# Patient Record
Sex: FEMALE | NOT HISPANIC OR LATINO | URBAN - METROPOLITAN AREA
[De-identification: names, ages, dates, MRNs, and addresses within clinical notes are randomized per-mention and may not be internally consistent; named-entity substitution may affect disease eponyms.]

---

## 2017-03-16 ENCOUNTER — OFFICE VISIT - RIVER FALLS (OUTPATIENT)
Dept: FAMILY MEDICINE | Facility: CLINIC | Age: 72
End: 2017-03-16

## 2017-06-08 ENCOUNTER — OFFICE VISIT - RIVER FALLS (OUTPATIENT)
Dept: FAMILY MEDICINE | Facility: CLINIC | Age: 72
End: 2017-06-08

## 2017-06-08 ASSESSMENT — MIFFLIN-ST. JEOR: SCORE: 1671.15

## 2017-06-14 ENCOUNTER — OFFICE VISIT - RIVER FALLS (OUTPATIENT)
Dept: FAMILY MEDICINE | Facility: CLINIC | Age: 72
End: 2017-06-14

## 2017-06-14 ENCOUNTER — COMMUNICATION - RIVER FALLS (OUTPATIENT)
Dept: FAMILY MEDICINE | Facility: CLINIC | Age: 72
End: 2017-06-14

## 2017-06-15 LAB — HBA1C MFR BLD: 8.1 %

## 2017-11-06 ENCOUNTER — OFFICE VISIT - RIVER FALLS (OUTPATIENT)
Dept: FAMILY MEDICINE | Facility: CLINIC | Age: 72
End: 2017-11-06

## 2017-11-06 ASSESSMENT — MIFFLIN-ST. JEOR: SCORE: 1622.16

## 2017-11-07 LAB
CHOLEST SERPL-MCNC: 166 MG/DL
CHOLEST/HDLC SERPL: 3.4 {RATIO}
CREAT SERPL-MCNC: 2 MG/DL (ref 0.6–0.93)
GLUCOSE BLD-MCNC: 104 MG/DL (ref 65–99)
HBA1C MFR BLD: 7.3 %
HDLC SERPL-MCNC: 49 MG/DL
LDLC SERPL CALC-MCNC: 92 MG/DL
NONHDLC SERPL-MCNC: 117 MG/DL
TRIGL SERPL-MCNC: 149 MG/DL

## 2018-03-14 ENCOUNTER — OFFICE VISIT - RIVER FALLS (OUTPATIENT)
Dept: FAMILY MEDICINE | Facility: CLINIC | Age: 73
End: 2018-03-14

## 2018-03-14 ASSESSMENT — MIFFLIN-ST. JEOR: SCORE: 1647.56

## 2018-03-15 LAB
CHOLEST SERPL-MCNC: 163 MG/DL
CHOLEST/HDLC SERPL: 2.9 {RATIO}
CREAT SERPL-MCNC: 1.47 MG/DL (ref 0.6–0.93)
GLUCOSE BLD-MCNC: 110 MG/DL (ref 65–99)
HBA1C MFR BLD: 7.2 %
HDLC SERPL-MCNC: 57 MG/DL
LDLC SERPL CALC-MCNC: 86 MG/DL
NONHDLC SERPL-MCNC: 106 MG/DL
TRIGL SERPL-MCNC: 107 MG/DL

## 2018-03-26 ENCOUNTER — OFFICE VISIT - RIVER FALLS (OUTPATIENT)
Dept: FAMILY MEDICINE | Facility: CLINIC | Age: 73
End: 2018-03-26

## 2018-03-26 ASSESSMENT — MIFFLIN-ST. JEOR: SCORE: 1661.17

## 2018-10-01 ENCOUNTER — OFFICE VISIT - RIVER FALLS (OUTPATIENT)
Dept: FAMILY MEDICINE | Facility: CLINIC | Age: 73
End: 2018-10-01

## 2018-10-01 ASSESSMENT — MIFFLIN-ST. JEOR: SCORE: 1660.26

## 2019-04-24 ENCOUNTER — OFFICE VISIT - RIVER FALLS (OUTPATIENT)
Dept: FAMILY MEDICINE | Facility: CLINIC | Age: 74
End: 2019-04-24

## 2019-04-24 ASSESSMENT — MIFFLIN-ST. JEOR: SCORE: 1663.89

## 2019-04-25 LAB
A/G RATIO - HISTORICAL: 1.4 (ref 1–2.5)
ALBUMIN SERPL-MCNC: 3.8 GM/DL (ref 3.6–5.1)
ALP SERPL-CCNC: 99 UNIT/L (ref 33–130)
ALT SERPL W P-5'-P-CCNC: 21 UNIT/L (ref 6–29)
AST SERPL W P-5'-P-CCNC: 22 UNIT/L (ref 10–35)
BILIRUB SERPL-MCNC: 0.5 MG/DL (ref 0.2–1.2)
BUN SERPL-MCNC: 38 MG/DL (ref 7–25)
BUN/CREAT RATIO - HISTORICAL: 21 (ref 6–22)
CALCIUM SERPL-MCNC: 9.4 MG/DL (ref 8.6–10.4)
CHLORIDE BLD-SCNC: 103 MMOL/L (ref 98–110)
CHOLEST SERPL-MCNC: 167 MG/DL
CHOLEST/HDLC SERPL: 2.9 {RATIO}
CO2 SERPL-SCNC: 28 MMOL/L (ref 20–32)
CREAT SERPL-MCNC: 1.82 MG/DL (ref 0.6–0.93)
CREAT UR-MCNC: 23 MG/DL (ref 20–275)
EGFRCR SERPLBLD CKD-EPI 2021: 27 ML/MIN/1.73M2
ERYTHROCYTE [DISTWIDTH] IN BLOOD BY AUTOMATED COUNT: 12.1 % (ref 11–15)
GLOBULIN: 2.8 (ref 1.9–3.7)
GLUCOSE BLD-MCNC: 140 MG/DL (ref 65–99)
HBA1C MFR BLD: 8.7 %
HCT VFR BLD AUTO: 39.5 % (ref 35–45)
HDLC SERPL-MCNC: 58 MG/DL
HGB BLD-MCNC: 13.2 GM/DL (ref 11.7–15.5)
LDLC SERPL CALC-MCNC: 89 MG/DL
MCH RBC QN AUTO: 31.8 PG (ref 27–33)
MCHC RBC AUTO-ENTMCNC: 33.4 GM/DL (ref 32–36)
MCV RBC AUTO: 95.2 FL (ref 80–100)
MICROALBUMIN UR-MCNC: 4 MG/DL
MICROALBUMIN/CREAT UR: 174 MG/G{CREAT}
NONHDLC SERPL-MCNC: 109 MG/DL
PLATELET # BLD AUTO: 312 10*3/UL (ref 140–400)
PMV BLD: 11.5 FL (ref 7.5–12.5)
POTASSIUM BLD-SCNC: 4.8 MMOL/L (ref 3.5–5.3)
PROT SERPL-MCNC: 6.6 GM/DL (ref 6.1–8.1)
RBC # BLD AUTO: 4.15 10*6/UL (ref 3.8–5.1)
SODIUM SERPL-SCNC: 141 MMOL/L (ref 135–146)
TRIGL SERPL-MCNC: 107 MG/DL
WBC # BLD AUTO: 10.8 10*3/UL (ref 3.8–10.8)

## 2019-08-15 ENCOUNTER — OFFICE VISIT - RIVER FALLS (OUTPATIENT)
Dept: FAMILY MEDICINE | Facility: CLINIC | Age: 74
End: 2019-08-15

## 2019-08-15 ASSESSMENT — MIFFLIN-ST. JEOR: SCORE: 1663.89

## 2019-08-16 LAB
ERYTHROCYTE [DISTWIDTH] IN BLOOD BY AUTOMATED COUNT: 11.8 % (ref 11–15)
HBA1C MFR BLD: 8.6 %
HCT VFR BLD AUTO: 40.5 % (ref 35–45)
HGB BLD-MCNC: 13.1 GM/DL (ref 11.7–15.5)
MCH RBC QN AUTO: 31.4 PG (ref 27–33)
MCHC RBC AUTO-ENTMCNC: 32.3 GM/DL (ref 32–36)
MCV RBC AUTO: 97.1 FL (ref 80–100)
PLATELET # BLD AUTO: 308 10*3/UL (ref 140–400)
PMV BLD: 11.3 FL (ref 7.5–12.5)
RBC # BLD AUTO: 4.17 10*6/UL (ref 3.8–5.1)
WBC # BLD AUTO: 11.5 10*3/UL (ref 3.8–10.8)

## 2019-08-19 ENCOUNTER — COMMUNICATION - RIVER FALLS (OUTPATIENT)
Dept: FAMILY MEDICINE | Facility: CLINIC | Age: 74
End: 2019-08-19

## 2019-11-21 ENCOUNTER — OFFICE VISIT - RIVER FALLS (OUTPATIENT)
Dept: FAMILY MEDICINE | Facility: CLINIC | Age: 74
End: 2019-11-21

## 2019-11-21 ASSESSMENT — MIFFLIN-ST. JEOR: SCORE: 1663.89

## 2019-11-22 LAB — HBA1C MFR BLD: 8.7 %

## 2019-11-25 ENCOUNTER — COMMUNICATION - RIVER FALLS (OUTPATIENT)
Dept: FAMILY MEDICINE | Facility: CLINIC | Age: 74
End: 2019-11-25

## 2020-03-20 ENCOUNTER — OFFICE VISIT - RIVER FALLS (OUTPATIENT)
Dept: FAMILY MEDICINE | Facility: CLINIC | Age: 75
End: 2020-03-20

## 2020-05-05 ENCOUNTER — OFFICE VISIT - RIVER FALLS (OUTPATIENT)
Dept: FAMILY MEDICINE | Facility: CLINIC | Age: 75
End: 2020-05-05

## 2020-05-05 ASSESSMENT — MIFFLIN-ST. JEOR: SCORE: 1645.75

## 2020-05-15 ENCOUNTER — OFFICE VISIT - RIVER FALLS (OUTPATIENT)
Dept: FAMILY MEDICINE | Facility: CLINIC | Age: 75
End: 2020-05-15

## 2020-05-15 ASSESSMENT — MIFFLIN-ST. JEOR: SCORE: 1627.6

## 2020-11-25 ENCOUNTER — OFFICE VISIT - RIVER FALLS (OUTPATIENT)
Dept: FAMILY MEDICINE | Facility: CLINIC | Age: 75
End: 2020-11-25

## 2020-11-25 ASSESSMENT — MIFFLIN-ST. JEOR: SCORE: 1620.34

## 2020-11-26 LAB
A/G RATIO - HISTORICAL: 1.5 (ref 1–2.5)
ALBUMIN SERPL-MCNC: 4.2 GM/DL (ref 3.6–5.1)
ALP SERPL-CCNC: 149 UNIT/L (ref 37–153)
ALT SERPL W P-5'-P-CCNC: 102 UNIT/L (ref 6–29)
AST SERPL W P-5'-P-CCNC: 77 UNIT/L (ref 10–35)
BILIRUB SERPL-MCNC: 0.6 MG/DL (ref 0.2–1.2)
BUN SERPL-MCNC: 49 MG/DL (ref 7–25)
BUN/CREAT RATIO - HISTORICAL: 18 (ref 6–22)
CALCIUM SERPL-MCNC: 9.9 MG/DL (ref 8.6–10.4)
CHLORIDE BLD-SCNC: 95 MMOL/L (ref 98–110)
CHOLEST SERPL-MCNC: 164 MG/DL
CHOLEST/HDLC SERPL: 2.8 {RATIO}
CO2 SERPL-SCNC: 38 MMOL/L (ref 20–32)
CREAT SERPL-MCNC: 2.65 MG/DL (ref 0.6–0.93)
EGFRCR SERPLBLD CKD-EPI 2021: 17 ML/MIN/1.73M2
ERYTHROCYTE [DISTWIDTH] IN BLOOD BY AUTOMATED COUNT: 11.8 % (ref 11–15)
GLOBULIN: 2.8 (ref 1.9–3.7)
GLUCOSE BLD-MCNC: 46 MG/DL (ref 65–139)
HBA1C MFR BLD: 6.5 %
HCT VFR BLD AUTO: 41.8 % (ref 35–45)
HDLC SERPL-MCNC: 59 MG/DL
HGB BLD-MCNC: 14.2 GM/DL (ref 11.7–15.5)
LDLC SERPL CALC-MCNC: 86 MG/DL
MCH RBC QN AUTO: 32.8 PG (ref 27–33)
MCHC RBC AUTO-ENTMCNC: 34 GM/DL (ref 32–36)
MCV RBC AUTO: 96.5 FL (ref 80–100)
NONHDLC SERPL-MCNC: 105 MG/DL
PLATELET # BLD AUTO: 275 10*3/UL (ref 140–400)
PMV BLD: 11.8 FL (ref 7.5–12.5)
POTASSIUM BLD-SCNC: 3.7 MMOL/L (ref 3.5–5.3)
PROT SERPL-MCNC: 7 GM/DL (ref 6.1–8.1)
RBC # BLD AUTO: 4.33 10*6/UL (ref 3.8–5.1)
SODIUM SERPL-SCNC: 143 MMOL/L (ref 135–146)
TRIGL SERPL-MCNC: 98 MG/DL
TSH SERPL DL<=0.005 MIU/L-ACNC: 3.36 MIU/L (ref 0.4–4.5)
WBC # BLD AUTO: 13 10*3/UL (ref 3.8–10.8)

## 2020-11-30 ENCOUNTER — COMMUNICATION - RIVER FALLS (OUTPATIENT)
Dept: FAMILY MEDICINE | Facility: CLINIC | Age: 75
End: 2020-11-30

## 2022-02-12 VITALS
WEIGHT: 264.4 LBS | SYSTOLIC BLOOD PRESSURE: 128 MMHG | HEIGHT: 62 IN | BODY MASS INDEX: 48.66 KG/M2 | HEART RATE: 66 BPM | SYSTOLIC BLOOD PRESSURE: 128 MMHG | OXYGEN SATURATION: 98 % | DIASTOLIC BLOOD PRESSURE: 66 MMHG | DIASTOLIC BLOOD PRESSURE: 72 MMHG | HEIGHT: 62 IN | BODY MASS INDEX: 49.21 KG/M2 | WEIGHT: 267.4 LBS | HEART RATE: 74 BPM

## 2022-02-12 VITALS
WEIGHT: 269.6 LBS | HEART RATE: 70 BPM | WEIGHT: 269.6 LBS | BODY MASS INDEX: 49.31 KG/M2 | DIASTOLIC BLOOD PRESSURE: 68 MMHG | HEART RATE: 72 BPM | SYSTOLIC BLOOD PRESSURE: 128 MMHG | BODY MASS INDEX: 49.61 KG/M2 | TEMPERATURE: 96.6 F | HEIGHT: 62 IN | SYSTOLIC BLOOD PRESSURE: 120 MMHG | DIASTOLIC BLOOD PRESSURE: 72 MMHG

## 2022-02-12 VITALS
SYSTOLIC BLOOD PRESSURE: 142 MMHG | WEIGHT: 264 LBS | TEMPERATURE: 97.6 F | HEIGHT: 62 IN | TEMPERATURE: 97.4 F | HEART RATE: 64 BPM | BODY MASS INDEX: 47.84 KG/M2 | DIASTOLIC BLOOD PRESSURE: 78 MMHG | OXYGEN SATURATION: 96 % | RESPIRATION RATE: 16 BRPM | BODY MASS INDEX: 48.58 KG/M2 | HEART RATE: 70 BPM | SYSTOLIC BLOOD PRESSURE: 126 MMHG | DIASTOLIC BLOOD PRESSURE: 78 MMHG | HEIGHT: 62 IN | WEIGHT: 260 LBS

## 2022-02-12 VITALS
DIASTOLIC BLOOD PRESSURE: 74 MMHG | WEIGHT: 257.2 LBS | SYSTOLIC BLOOD PRESSURE: 128 MMHG | HEART RATE: 78 BPM | OXYGEN SATURATION: 94 % | TEMPERATURE: 97.7 F | BODY MASS INDEX: 47.04 KG/M2

## 2022-02-12 VITALS
OXYGEN SATURATION: 95 % | WEIGHT: 268 LBS | HEIGHT: 62 IN | SYSTOLIC BLOOD PRESSURE: 130 MMHG | HEART RATE: 71 BPM | BODY MASS INDEX: 49.32 KG/M2 | DIASTOLIC BLOOD PRESSURE: 66 MMHG

## 2022-02-12 VITALS
WEIGHT: 268 LBS | HEIGHT: 62 IN | SYSTOLIC BLOOD PRESSURE: 130 MMHG | OXYGEN SATURATION: 98 % | DIASTOLIC BLOOD PRESSURE: 84 MMHG | HEART RATE: 72 BPM | TEMPERATURE: 97.4 F | BODY MASS INDEX: 49.32 KG/M2

## 2022-02-12 VITALS
DIASTOLIC BLOOD PRESSURE: 74 MMHG | HEART RATE: 69 BPM | OXYGEN SATURATION: 94 % | HEIGHT: 62 IN | WEIGHT: 258.4 LBS | SYSTOLIC BLOOD PRESSURE: 124 MMHG | BODY MASS INDEX: 47.55 KG/M2

## 2022-02-12 VITALS
OXYGEN SATURATION: 95 % | TEMPERATURE: 97.4 F | HEART RATE: 70 BPM | SYSTOLIC BLOOD PRESSURE: 130 MMHG | WEIGHT: 268 LBS | HEIGHT: 62 IN | DIASTOLIC BLOOD PRESSURE: 74 MMHG | BODY MASS INDEX: 49.32 KG/M2

## 2022-02-12 VITALS
SYSTOLIC BLOOD PRESSURE: 124 MMHG | OXYGEN SATURATION: 91 % | HEIGHT: 62 IN | BODY MASS INDEX: 47.62 KG/M2 | HEART RATE: 71 BPM | DIASTOLIC BLOOD PRESSURE: 70 MMHG | WEIGHT: 258.8 LBS

## 2022-02-12 VITALS
HEART RATE: 65 BPM | DIASTOLIC BLOOD PRESSURE: 80 MMHG | HEIGHT: 62 IN | OXYGEN SATURATION: 97 % | SYSTOLIC BLOOD PRESSURE: 132 MMHG | WEIGHT: 267.2 LBS | BODY MASS INDEX: 49.17 KG/M2

## 2022-02-15 NOTE — PROGRESS NOTES
Patient:   BOO ESCOBAR            MRN: [id number]            FIN: [id number]               Age:   74 years     Sex:  Female     :  1945   Associated Diagnoses:   Toe pain, left   Author:   Carmelita Zamora      Visit Information      Date of Service: 2020 09:30 am  Performing Location: [facility]  Encounter#: [id number]   Visit type:  Telephone Encounter.    Source of history:  Patient.    Location of patient:  _home  Call Start Time:   _1021  Call End Time:    _1034      Chief Complaint   _      History of Present Illness   Today's visit was conducted via telephone due to the COVID-19 pandemic.     Reason for visit:  Pt with c/o left foot 2nd toe pain for 6 hours. She has DM. She has had gout in the past but no flare since . She uses allopurinol daily to prevent. She feels like she has a little pimple or blister at the site of the pain. She bumped the foot on her walker yesterday. She has used an ice pack and pain patch on the foot, the patch wasn't helpful.  Denies fever.      Impression and Plan   Diagnosis     Toe pain, left ([id number] M79.675).     Plan:  presumed infection given 'pimple' and trauma. Consider gout if not improving  certainly is at risk for complications given her DM  Will soak foot in epsom salts 15 min bid, start antibiotic, tylenol of rpain. She is to check back with DR [facility] clinic after the weekend, call sooner if worsening, she is agreement to plan.    Orders     Orders (Selected)   Prescriptions  Prescribed  cephalexin 500 mg oral capsule: = 1 cap(s) ( 500 mg ), Oral, tid, x 5 day(s), # 15 cap(s), 0 Refill(s), Type: Acute, [facility], 1 cap(s) Oral tid,x5 day(s).        Health Status   Allergies:    Allergic Reactions (Selected)  Moderate  Neosporin (Skin rash)   Medications:  (Selected)   Prescriptions  Prescribed  Alavert D-12 oral tablet, extended release: 1 tab(s), Oral, q12 hrs, # 180 tab(s), 1 Refill(s), Type:  Maintenance, Pharmacy: [facility] [place] [place], 1 tab(s) Oral q12 hrs  Aspir 81 oral delayed release tablet: 1 tab(s) ( 81 mg ), PO, Daily, # 100 tab(s), 0 Refill(s), Type: Maintenance, Pharmacy: [facility] [place] [place], 1 tab(s) po daily  Ronak Contour next test strips: Ronak Contour next test strips, See Instructions, Instructions: Testing BID, Supply, # 200 EA, 1 Refill(s), Type: Maintenance, Pharmacy: [id number], Testing BID  CONTOUR NEXT TEST Strips: CONTOUR NEXT TEST Strips, See Instructions, Instructions: USE ONE STRIP TO CHECK GLUCOSE BID  DX: E11.9, Supply, # 120 EA, 5 Refill(s), Type: Soft Stop, Pharmacy: [facility] [place], USE ONE STRIP TO CHECK GLUCOSE BID; DX: E11.9  HumaLOG KwikPen 100 units/mL injectable solution: ( 10 unit(s) ), subcutaneous, tidac, # 2 syringes, 5 Refill(s), Type: Maintenance, Pharmacy: [facility] [place], Dispense 90 day supply, 10 unit(s) Subcutaneous tidac  Levemir FlexTouch 100 units/mL subcutaneous solution: See Instructions, Instructions: 40 units sc QHS, # 5 syringes, 3 Refill(s), Type: Maintenance, Pharmacy: [facility] [place], 40 units sc QHS  Medrol Dosepak 4 mg oral tablet: = 1 packet(s), PO, Once, Instructions: as directed on package labeling, # 21 tab(s), 0 Refill(s), Type: Soft Stop, Pharmacy: [facility] [place], 1 packet(s) Oral once,Instr:as directed on package labeling  Relion Pen Needles [id number] MIS: Relion Pen Needles [id number] MIS, See Instructions, Instructions: for DM E11.9 use with insulin injection QID, Supply, # 120 EA, 5 Refill(s), Type: Maintenance, Pharmacy: [facility] [place], for DM E11.9 use with insulin injection QID  allopurinol 100 mg oral tablet: = 1 tab(s) ( 100 mg ), PO, daily, # 90 tab(s), 1 Refill(s), Type: Maintenance, [facility]: [facility], 1 tab(s) Oral daily  atenolol-chlorthalidone 50 mg-25 mg oral  tablet: 1 tab(s), po, daily, # 90 tab(s), 1 Refill(s), Type: Maintenance, Pharmacy: [facility], 1 tab(s) Oral daily  azithromycin 250 mg oral tablet: = 1 tab(s) ( 250 mg ), PO, Daily, # 7 tab(s), 0 Refill(s), Type: Maintenance, Pharmacy: [facility], 1 tab(s) Oral daily,x7 day(s)  betamethasone valerate 0.1% topical cream: 1 adriel, TOP, BID, # 45 g, 1 Refill(s), Type: Maintenance, Pharmacy: [facility], 1 adriel Topical bid  cephalexin 500 mg oral capsule: = 1 cap(s) ( 500 mg ), Oral, tid, x 5 day(s), # 15 cap(s), 0 Refill(s), Type: Acute, Pharmacy: [facility], 1 cap(s) Oral tid,x5 day(s)  cyclobenzaprine 10 mg oral tablet: = 1 tab(s) ( 10 mg ), PO, q6 hrs, PRN: for spasm, # 20 tab(s), 0 Refill(s), Type: Maintenance, Pharmacy: [facility], 1 tab(s) Oral q6 hrs,PRN:for spasm  furosemide 20 mg oral tablet: = 2 tab(s) ( 40 mg ), po, daily, # 180 tab(s), 1 Refill(s), Type: Maintenance, Pharmacy: [facility], 2 tab(s) Oral daily  oxybutynin 5 mg oral tablet: = 1 tab(s) ( 5 mg ), po, bid, # 180 tab(s), 1 Refill(s), Type: Maintenance, Pharmacy: [facility], 1 tab(s) Oral bid  simvastatin 40 mg oral tablet: = 1 tab(s) ( 40 mg ), po, hs, # 90 tab(s), 1 Refill(s), Type: Maintenance, [facility]  [facility], 1 tab(s) Oral hs   Problem list:    All Problems  Allergic Rhinitis / ICD-9- / Confirmed  Asthma / ICD-9-.90 / Confirmed  Basal cell carcinoma / ICD-9-.91 / Confirmed  Chronic Renal Disease, Stage III / ICD-9-.3 / Confirmed  Current use of insulin / SNOMED CT 5671573849 / Confirmed  Gout / SNOMED CT 776306307 / Confirmed  HLD (Hyperlipidemia) / SNOMED CT 981151867 / Confirmed  HTN (hypertension) / SNOMED CT 4699180543 / Confirmed  Impaired circulation of left lower extremity / SNOMED CT 35869924 / Confirmed  Incontinence /  SNOMED CT 01163438 / Confirmed  urge  Menopause / ICD-9-.2 / Confirmed  OA (Osteoarthritis) / ICD-9-.90 / Confirmed  right knee  Obesity / ICD-9-.00 / Confirmed  Overactive bladder / ICD-9-.51 / Confirmed  Type 2 diabetes mellitus / SNOMED CT 109639139 / Confirmed  Inactive: Tobacco abuse / ICD-9-.1  Former tobacco user.  Resolved: *Hospitalized @ [facility] Chest pain  Canceled: Hyperlipemia / ICD-9-.4      Histories   Past Medical History:    Active  Obesity (278.00)  Type 2 diabetes mellitus (606203275)  HTN (hypertension) (6875778071)  OA (Osteoarthritis) (715.90)  Comments:  2010 CST 2:32 PM JIHAN - Milena Lowry LPN  right knee  Asthma (493.90)  Chronic Renal Disease, Stage III (585.3)  Incontinence (62170803)  Comments:  2010 CST 2:33 PM CST - Shackleton LPN, Milena  urge  Overactive bladder (596.51)  Menopause (627.2)  Allergic Rhinitis (477)  Resolved  *Hospitalized @ [facility] Chest pain:  Resolved.   Family History:    Cancer  Grandmother (M)  Emphysema of lung  Father     Procedure history:    Anal sphincterotomy (SNOMED CT 298171495) on 2005 at 60 Years.  Comments:  2013 1:31 PM JIHAN - Zohra Terrazas MD  Carpal tunnel release (SNOMED CT 524956555) in  at 43 Years.  Comments:  2010 10:57 AM T - Milena Lowry LPN  bilateral   section (SNOMED CT 37296857) in  at 34 Years.  Appendectomy (SNOMED CT 752604149) in 1952 at 7 Years.  Tonsillectomy (SNOMED CT 255344056) in 1947 at 13 Months.   Social History:        Alcohol Assessment: Denies Alcohol Use            Never      Tobacco Assessment: Past            Past      Substance Abuse Assessment            Never      Employment and Education Assessment            Retired      Home and Environment Assessment            Marital status: .  Lives with Self.  1 children.  Living situation: Home/Independent.      Exercise and Physical Activity  Assessment: Does not exercise            Exercise frequency: No regular exercise..

## 2022-02-15 NOTE — NURSING NOTE
Comprehensive Intake Entered On:  11/21/2019 10:25 AM CST    Performed On:  11/21/2019 10:20 AM CST by Grace Dumont CMA               Summary   Chief Complaint :   Pt here for med ck and cough ER f/u   Weight Measured :   268 lb(Converted to: 268 lb 0 oz, 121.56 kg)    Height Measured :   62 in(Converted to: 5 ft 2 in, 157.48 cm)    Body Mass Index :   49.01 kg/m2 (HI)    Body Surface Area :   2.3 m2   Systolic Blood Pressure :   130 mmHg   Diastolic Blood Pressure :   84 mmHg (HI)    Mean Arterial Pressure :   99 mmHg   Peripheral Pulse Rate :   72 bpm   Temperature Tympanic :   97.4 DegF(Converted to: 36.3 DegC)  (LOW)    Oxygen Saturation :   98 %   Grace Dumont CMA - 11/21/2019 10:20 AM CST   Health Status   Allergies Verified? :   Yes   Medication History Verified? :   Yes   Medical History Verified? :   Yes   Pre-Visit Planning Status :   Completed   Tobacco Use? :   Former smoker   Grace Dumont CMA - 11/21/2019 10:20 AM CST   Consents   Consent for Immunization Exchange :   Consent Granted   Consent for Immunizations to Providers :   Consent Granted   Grace Dumont CMA - 11/21/2019 10:20 AM CST   Meds / Allergies   (As Of: 11/21/2019 10:25:49 AM CST)   Allergies (Active)   Neosporin  Estimated Onset Date:   <not entered> 2012 ; Reactions:   skin rash ; Created By:   Thony Salazar MD; Reaction Status:   Active ; Category:   Drug ; Substance:   Neosporin ; Type:   Allergy ; Severity:   Moderate ; Updated By:   Thony Salazar MD; Reviewed Date:   11/21/2019 10:24 AM CST        Medication List   (As Of: 11/21/2019 10:25:49 AM CST)   Prescription/Discharge Order    allopurinol  :   allopurinol ; Status:   Prescribed ; Ordered As Mnemonic:   allopurinol 100 mg oral tablet ; Simple Display Line:   100 mg, 1 tab(s), PO, daily, 90 tab(s), 1 Refill(s) ; Ordering Provider:   Thony Salazar MD; Catalog Code:   allopurinol ; Order Dt/Tm:   4/24/2019 2:14:00 PM CDT          aspirin  :   aspirin ; Status:    Prescribed ; Ordered As Mnemonic:   Aspir 81 oral delayed release tablet ; Simple Display Line:   81 mg, 1 tab(s), PO, Daily, 100 tab(s), 0 Refill(s) ; Ordering Provider:   Thony Salazar MD; Catalog Code:   aspirin ; Order Dt/Tm:   3/26/2018 1:30:54 PM CDT          atenolol-chlorthalidone  :   atenolol-chlorthalidone ; Status:   Prescribed ; Ordered As Mnemonic:   atenolol-chlorthalidone 50 mg-25 mg oral tablet ; Simple Display Line:   1 tab(s), po, daily, 90 tab(s), 0 Refill(s) ; Ordering Provider:   Thony Salazar MD; Catalog Code:   atenolol-chlorthalidone ; Order Dt/Tm:   8/27/2019 8:52:27 AM CDT          betamethasone topical  :   betamethasone topical ; Status:   Prescribed ; Ordered As Mnemonic:   betamethasone valerate 0.1% topical cream ; Simple Display Line:   1 adriel, TOP, BID, 45 g, 1 Refill(s) ; Ordering Provider:   Thony Salazar MD; Catalog Code:   betamethasone topical ; Order Dt/Tm:   3/26/2018 1:30:02 PM CDT          cyclobenzaprine  :   cyclobenzaprine ; Status:   Prescribed ; Ordered As Mnemonic:   cyclobenzaprine 10 mg oral tablet ; Simple Display Line:   10 mg, 1 tab(s), PO, q6 hrs, PRN: for spasm, 20 tab(s), 0 Refill(s) ; Ordering Provider:   Thony Salazar MD; Catalog Code:   cyclobenzaprine ; Order Dt/Tm:   8/15/2019 1:57:27 PM CDT          furosemide  :   furosemide ; Status:   Prescribed ; Ordered As Mnemonic:   furosemide 20 mg oral tablet ; Simple Display Line:   40 mg, 2 tab(s), po, daily, 180 tab(s), 1 Refill(s) ; Ordering Provider:   Thony Salazar MD; Catalog Code:   furosemide ; Order Dt/Tm:   4/24/2019 2:13:34 PM CDT          insulin detemir  :   insulin detemir ; Status:   Prescribed ; Ordered As Mnemonic:   Levemir FlexTouch 100 units/mL subcutaneous solution ; Simple Display Line:   See Instructions, 40 units sc QHS, 5 syringes, 3 Refill(s) ; Ordering Provider:   Thony Salazar MD; Catalog Code:   insulin detemir ; Order Dt/Tm:   10/7/2019 1:33:41 PM CDT          insulin lispro   :   insulin lispro ; Status:   Prescribed ; Ordered As Mnemonic:   HumaLOG KwikPen 100 units/mL injectable solution ; Simple Display Line:   7 unit(s), subcutaneous, tidac, 2 syringes, 5 Refill(s) ; Ordering Provider:   Thony Salazar MD; Catalog Code:   insulin lispro ; Order Dt/Tm:   8/14/2019 3:54:51 PM CDT          loratadine-pseudoephedrine  :   loratadine-pseudoephedrine ; Status:   Prescribed ; Ordered As Mnemonic:   Alavert D-12 oral tablet, extended release ; Simple Display Line:   1 tab(s), Oral, q12 hrs, 180 tab(s), 1 Refill(s) ; Ordering Provider:   Thony Salazar MD; Catalog Code:   loratadine-pseudoephedrine ; Order Dt/Tm:   6/18/2019 3:11:31 PM CDT          Miscellaneous Prescription  :   Miscellaneous Prescription ; Status:   Prescribed ; Ordered As Mnemonic:   Relion Pen Needles [id number] MIS ; Simple Display Line:   See Instructions, for DM E11.9 use with insulin injection QID, 120 EA, 5 Refill(s) ; Ordering Provider:   Thony Salazar MD; Catalog Code:   Miscellaneous Prescription ; Order Dt/Tm:   4/24/2019 2:12:02 PM CDT          Miscellaneous Rx Supply  :   Miscellaneous Rx Supply ; Status:   Prescribed ; Ordered As Mnemonic:   Ronak Contour next test strips ; Simple Display Line:   See Instructions, Testing BID, 200 EA, 1 Refill(s) ; Ordering Provider:   Thony Salazar MD; Catalog Code:   Miscellaneous Rx Supply ; Order Dt/Tm:   9/12/2016 11:07:30 AM CDT          Miscellaneous Rx Supply  :   Miscellaneous Rx Supply ; Status:   Prescribed ; Ordered As Mnemonic:   CONTOUR NEXT TEST Strips ; Simple Display Line:   See Instructions, USE ONE STRIP TO CHECK GLUCOSE BID  DX: E11.9, 120 EA, 5 Refill(s) ; Ordering Provider:   Thony Salazar MD; Catalog Code:   Miscellaneous Rx Supply ; Order Dt/Tm:   10/8/2019 7:29:46 AM CDT          oxybutynin  :   oxybutynin ; Status:   Prescribed ; Ordered As Mnemonic:   oxybutynin 5 mg oral tablet ; Simple Display Line:   5 mg, 1 tab(s), po, bid, 180 tab(s), 1  Refill(s) ; Ordering Provider:   Thony Salazar MD; Catalog Code:   oxybutynin ; Order Dt/Tm:   4/24/2019 2:13:44 PM CDT          simvastatin  :   simvastatin ; Status:   Prescribed ; Ordered As Mnemonic:   simvastatin 40 mg oral tablet ; Simple Display Line:   40 mg, 1 tab(s), po, hs, 90 tab(s), 1 Refill(s) ; Ordering Provider:   Thony Salazar MD; Catalog Code:   simvastatin ; Order Dt/Tm:   4/24/2019 2:13:51 PM CDT            Home Meds    benzonatate  :   benzonatate ; Status:   Documented ; Ordered As Mnemonic:   benzonatate 100 mg oral capsule ; Simple Display Line:   100 mg, 1 cap(s), Oral, tid, for 7 day(s), 0 Refill(s) ; Catalog Code:   benzonatate ; Order Dt/Tm:   11/21/2019 10:22:53 AM CST          naproxen  :   naproxen ; Status:   Documented ; Ordered As Mnemonic:   naproxen 250 mg oral tablet ; Simple Display Line:   250 mg, 1 tab(s), Oral, bid, 0 Refill(s) ; Catalog Code:   naproxen ; Order Dt/Tm:   11/21/2019 10:22:36 AM CST

## 2022-02-15 NOTE — TELEPHONE ENCOUNTER
Entered by Bryn Harman MD on June 01, 2021 7:15:54 AM CDT  ---------------------  From: Bryn Harman MD   To: [facility]    Sent: 6/1/2021 7:15:54 AM CDT  Subject: Medication Management     ** Submitted: **  Complete:insulin detemir (Levemir FlexTouch 100 units/mL subcutaneous solution)   Signed by Bryn Harman MD  6/1/2021 12:15:00 PM [facility]    ** Approved **  insulin detemir (Levemir FlexTouch U-100 Insulin 100 unit/mL (3 mL) subcutaneous pen)  TAKE 40 UNITS SUBCUTANEOUSLY (UNDER THE SKIN) ONCE DAILY AT BEDTIME  Qty:  15 mL        Days Supply:  36        Refills:  0          Substitutions Allowed     Route To Pharmacy - [facility]   Signed by Bryn Harman MD            ------------------------------------------  From: [facility]  To: Thony Salazar MD  Sent: May 28, 2021 5:35:09 PM CDT  Subject: Medication Management  Due: May 14, 2021 8:11:22 PM CDT     ** On Hold Pending Signature **     Dispensed Drug: insulin detemir (Levemir FlexTouch 100 units/mL subcutaneous solution), TAKE 40 UNITS SUBCUTANEOUSLY (UNDER THE SKIN) ONCE DAILY AT BEDTIME  Quantity: 15 mL  Days Supply: 36  Refills: 0  Substitutions Allowed  Notes from Pharmacy:  ------------------------------------------

## 2022-02-15 NOTE — NURSING NOTE
Comprehensive Intake Entered On:  11/25/2020 2:13 PM CST    Performed On:  11/25/2020 2:05 PM CST by Zohra Gibbs MA               Summary   Chief Complaint :   med check, needs refills.  also c/o neck/right shoulder pain.   Weight Measured :   258.4 lb(Converted to: 258 lb 6 oz, 117.208 kg)    Height Measured :   62 in(Converted to: 5 ft 2 in, 157.48 cm)    Body Mass Index :   47.26 kg/m2 (HI)    Body Surface Area :   2.26 m2   Systolic Blood Pressure :   124 mmHg   Diastolic Blood Pressure :   74 mmHg   Mean Arterial Pressure :   91 mmHg   Peripheral Pulse Rate :   69 bpm   BP Site :   Right arm   Pulse Site :   Radial artery   BP Method :   Manual   HR Method :   Manual   Oxygen Saturation :   94 %   Zohra Gibbs MA - 11/25/2020 2:05 PM CST   Meds / Allergies   (As Of: 11/25/2020 2:13:31 PM CST)   Allergies (Active)   Neosporin  Estimated Onset Date:   <not entered> 2012 ; Reactions:   skin rash ; Created By:   Thony Salazar MD; Reaction Status:   Active ; Category:   Drug ; Substance:   Neosporin ; Type:   Allergy ; Severity:   Moderate ; Updated By:   Thony Salazar MD; Reviewed Date:   5/15/2020 1:23 PM CDT        Medication List   (As Of: 11/25/2020 2:13:31 PM CST)   Prescription/Discharge Order    allopurinol  :   allopurinol ; Status:   Prescribed ; Ordered As Mnemonic:   allopurinol 100 mg oral tablet ; Simple Display Line:   100 mg, 1 tab(s), PO, daily, 90 tab(s), 1 Refill(s) ; Ordering Provider:   Thony Salazar MD; Catalog Code:   allopurinol ; Order Dt/Tm:   8/19/2020 11:12:35 AM CDT          aspirin  :   aspirin ; Status:   Prescribed ; Ordered As Mnemonic:   Aspir 81 oral delayed release tablet ; Simple Display Line:   81 mg, 1 tab(s), PO, Daily, 100 tab(s), 0 Refill(s) ; Ordering Provider:   Thony Salazar MD; Catalog Code:   aspirin ; Order Dt/Tm:   3/26/2018 1:30:54 PM CDT          atenolol-chlorthalidone  :   atenolol-chlorthalidone ; Status:   Prescribed ; Ordered As Mnemonic:    atenolol-chlorthalidone 50 mg-25 mg oral tablet ; Simple Display Line:   1 tab(s), po, daily, 30 tab(s), 0 Refill(s) ; Ordering Provider:   Thony Salazar MD; Catalog Code:   atenolol-chlorthalidone ; Order Dt/Tm:   11/11/2020 11:15:55 AM CST          betamethasone topical  :   betamethasone topical ; Status:   Prescribed ; Ordered As Mnemonic:   betamethasone valerate 0.1% topical cream ; Simple Display Line:   1 adriel, TOP, BID, 45 g, 1 Refill(s) ; Ordering Provider:   Thony Salazar MD; Catalog Code:   betamethasone topical ; Order Dt/Tm:   11/21/2019 10:49:50 AM CST          furosemide  :   furosemide ; Status:   Prescribed ; Ordered As Mnemonic:   furosemide 20 mg oral tablet ; Simple Display Line:   40 mg, 2 tab(s), po, daily, 180 tab(s), 1 Refill(s) ; Ordering Provider:   Thony Salazar MD; Catalog Code:   furosemide ; Order Dt/Tm:   11/21/2019 10:49:55 AM CST          insulin detemir  :   insulin detemir ; Status:   Prescribed ; Ordered As Mnemonic:   Levemir FlexTouch 100 units/mL subcutaneous solution ; Simple Display Line:   See Instructions, 40 units sc QHS.....for DM II  E11.9, 15 mL, 3 Refill(s) ; Ordering Provider:   Thony Salazar MD; Catalog Code:   insulin detemir ; Order Dt/Tm:   10/5/2020 12:56:37 PM CDT          insulin lispro  :   insulin lispro ; Status:   Prescribed ; Ordered As Mnemonic:   HumaLOG KwikPen 100 units/mL injectable solution ; Simple Display Line:   10 unit(s), subcutaneous, tidac, 15 mL, 1 Refill(s) ; Ordering Provider:   Thony Salazar MD; Catalog Code:   insulin lispro ; Order Dt/Tm:   9/23/2020 5:25:23 PM CDT          loratadine-pseudoephedrine  :   loratadine-pseudoephedrine ; Status:   Prescribed ; Ordered As Mnemonic:   Alavert D-12 oral tablet, extended release ; Simple Display Line:   1 tab(s), Oral, q12 hrs, 180 tab(s), 1 Refill(s) ; Ordering Provider:   Thony Salazar MD; Catalog Code:   loratadine-pseudoephedrine ; Order Dt/Tm:   11/21/2019 10:49:43 AM CST           Miscellaneous Prescription  :   Miscellaneous Prescription ; Status:   Prescribed ; Ordered As Mnemonic:   Relion Pen Needles [id number] MIS ; Simple Display Line:   See Instructions, for DM E11.9 use with insulin injection QID, 120 EA, 5 Refill(s) ; Ordering Provider:   Thony Salazar MD; Catalog Code:   Miscellaneous Prescription ; Order Dt/Tm:   8/19/2020 11:13:11 AM CDT          Miscellaneous Rx Supply  :   Miscellaneous Rx Supply ; Status:   Prescribed ; Ordered As Mnemonic:   Ronak Contour next test strips ; Simple Display Line:   See Instructions, Testing BID, 200 EA, 1 Refill(s) ; Ordering Provider:   Thony Salazar MD; Catalog Code:   Miscellaneous Rx Supply ; Order Dt/Tm:   9/12/2016 11:07:30 AM CDT          Miscellaneous Rx Supply  :   Miscellaneous Rx Supply ; Status:   Prescribed ; Ordered As Mnemonic:   CONTOUR NEXT TEST Strips ; Simple Display Line:   See Instructions, USE ONE STRIP TO CHECK GLUCOSE BID  DX: E11.9, 120 EA, 5 Refill(s) ; Ordering Provider:   Thony Salazar MD; Catalog Code:   Miscellaneous Rx Supply ; Order Dt/Tm:   11/19/2020 11:17:43 AM CST          simvastatin  :   simvastatin ; Status:   Prescribed ; Ordered As Mnemonic:   simvastatin 40 mg oral tablet ; Simple Display Line:   40 mg, 1 tab(s), po, hs, 90 tab(s), 1 Refill(s) ; Ordering Provider:   Thony Salazar MD; Catalog Code:   simvastatin ; Order Dt/Tm:   7/1/2020 2:21:42 PM CDT            ID Risk Screen   Recent Travel History :   No recent travel   Family Member Travel History :   No recent travel   Other Exposure to Infectious Disease :   Unknown   Zohra Gibbs MA - 11/25/2020 2:05 PM CST   Social History   Social History   (As Of: 11/25/2020 2:13:31 PM CST)   Alcohol:  Denies Alcohol Use      Never   (Last Updated: 1/18/2013 1:31:28 PM CST by Zohra Terrazas)          Tobacco:  Past      Former smoker, quit more than 30 days ago   (Last Updated: 11/25/2020 2:10:47 PM CST by Zohra Gibbs MA)          Electronic  Cigarette/Vaping:        Electronic Cigarette Use: Never.   (Last Updated: 11/25/2020 2:13:23 PM CST by Zohra Gibbs MA)          Substance Abuse:        Never   (Last Updated: 9/16/2013 10:39:31 AM CDT by Thony Salazar MD)          Employment/School:        Retired   (Last Updated: 9/16/2013 10:39:42 AM CDT by Thony Salazar MD)          Home/Environment:        Marital status: .  Lives with Self.  1 children.  Living situation: Home/Independent.   (Last Updated: 9/16/2013 10:40:37 AM CDT by Thony Salazar MD)          Exercise:  Does not exercise      Exercise frequency: No regular exercise..   (Last Updated: 1/18/2013 1:31:53 PM CST by Zohra Terrazas)

## 2022-02-15 NOTE — PROGRESS NOTES
Patient:   BOO ESCOBAR            MRN: [id number]            FIN: [id number]               Age:   73 years     Sex:  Female     :  1945   Associated Diagnoses:   HTN (hypertension); HLD (Hyperlipidemia); Type 2 diabetes mellitus; Gout; Overactive bladder   Author:   Thony Salazar MD      Impression and Plan   Diagnosis     HTN (hypertension) (IVK32-FL I10).     Course:  Well controlled.    Orders     Orders   Charges (Evaluation and Management):  55251 office outpatient visit 25 minutes (Charge) (Order): Quantity: 1, Type 2 diabetes mellitus  HTN (hypertension)  Asthma  HLD (Hyperlipidemia).     Orders (Selected)   Prescriptions  Prescribed  atenolol-chlorthalidone 50 mg-25 mg oral tablet: 1 tab(s), po, daily, # 90 tab(s), 1 Refill(s), Type: Maintenance, Pharmacy: [facility] [place], 1 tab(s) Oral daily  lisinopril 10 mg oral tablet: = 1 tab(s) ( 10 mg ), PO, Daily, # 90 tab(s), 1 Refill(s), Type: Maintenance, Pharmacy: [facility] [place], 1 tab(s) Oral daily.     Diagnosis     HLD (Hyperlipidemia) (DHF18-DS E78.00).     Course:  Progressing as expected.    Orders     Orders (Selected)   Prescriptions  Prescribed  simvastatin 40 mg oral tablet: = 1 tab(s) ( 40 mg ), po, hs, # 90 tab(s), 1 Refill(s), Type: Maintenance, Pharmacy: [facility] [place], [place], 1 tab(s) Oral hs.     Diagnosis     Type 2 diabetes mellitus (HOG98-OC E11.9).     Course:  Well controlled.    Orders     Orders (Selected)   Prescriptions  Prescribed  HumaLOG KwikPen 100 units/mL injectable solution: ( 4 unit(s) ), subcutaneous, tidac, # 2 syringes, 5 Refill(s), Type: Maintenance, Pharmacy: [facility] [place], Dispense 90 day supply, 4 unit(s) Subcutaneous tidac  Levemir FlexTouch 100 units/mL subcutaneous solution: See Instructions, Instructions: 36 units sc QHS, # 5 syringes, 3 Refill(s), Type: Maintenance, Pharmacy: [facility] [place], 36 units sc QHS.      Diagnosis     Gout (STZ51-OB M10.9).     Course:  Progressing as expected.    Orders     Orders (Selected)   Prescriptions  Prescribed  allopurinol 100 mg oral tablet: = 1 tab(s) ( 100 mg ), PO, daily, # 90 tab(s), 1 Refill(s), Type: Maintenance, Pharmacy: [facility] [place], [place], 1 tab(s) Oral daily.     Diagnosis     Overactive bladder (CIO08-OP N31.8).     Course:  Progressing as expected.    Orders     Orders (Selected)   Prescriptions  Prescribed  oxybutynin 5 mg oral tablet: = 1 tab(s) ( 5 mg ), po, bid, # 180 tab(s), 1 Refill(s), Type: Maintenance, Pharmacy: [facility] [place], [place], 1 tab(s) Oral bid.        Visit Information      Date of Service: 04/24/2019 01:52 pm  Performing Location: [facility]  Encounter#: [id number]      Primary Care Provider (PCP):  Thony Salazar MD# [id number]      Referring Provider:  Thony Salazar MD# [id number]   Visit type:  Scheduled follow-up.    Accompanied by:  No one.    Source of history:  Self.    History limitation:  None.       Chief Complaint   Chief complaint discussed and confirmed correct.     4/24/2019 1:55 PM CDT    Pt here for med ck        History of Present Illness             The patient presents for follow-up evaluation of diabetes.  The quality of the patient's diabetes is described as being unchanged from previous visit.  Relieving factors consist of diet changes, increased activity and medication.  Associated symptoms consist of none.  Medical encounters: none.  Compliance problems: none.  Glucose results: within target range.  Hemoglobin A1c results: > 6% and within target range.  Lifestyle modification: weight reduction, diet, increased physical activity.  Medications:.  Additional pertinent history: last podiatric foot exam: 4/24/2019 and eye exam recommended.  Notes.               The patient presents for follow-up evaluation of hypertension.  The quality of hypertension symptom(s) since the  patient's last visit is described as being unchanged.  The severity of the hypertension symptom(s) since the last visit is moderate.  Since the patient's last visit, the timing/course of hypertension symptom(s) is constant.  Exacerbating factors consist of none.  Relieving factors consist of medication.  Associated symptoms consist of none.  Prior treatment consists of lifestyle modification (weight reduction, dietary sodium restriction, increased physical activity, adoption of DASH eating plan).  Medical encounters: none.  Compliance problems: none.        Interval History   Cholesterol Management   Total cholesterol results < 200 mg/dL (optimal).  LDL cholesterol results < 100 mg/dL (optimal).  HDL cholesterol results >40mg/dl.  Triglyceride results < 150 mg/dL (normal).  The course is progressing as expected.  The effect on daily activities is no change in activity level and no change in eating habits.  Associated symptoms characterized by no fatigue, chest pain, joint pain, muscle weakness or myalgias.        Review of Systems   /   Constitutional:  Negative.    Eye:  Negative.    Ear/Nose/Mouth/Throat:  Negative.    Respiratory:  Negative.    Cardiovascular:  Negative.    Gastrointestinal:  Negative.    Genitourinary:  Negative.    Hematology/Lymphatics:  Negative.    Endocrine:  Negative.    Immunologic:  Negative.    Musculoskeletal:  Back pain.    Integumentary   Neurologic:  Negative.    Psychiatric:  Negative.    All other systems reviewed and negative      Health Status   Allergies:    Allergic Reactions (Selected)  Moderate  Neosporin (Skin rash)   Medications:  (Selected)   Prescriptions  Prescribed  Aspir 81 oral delayed release tablet: 1 tab(s) ( 81 mg ), PO, Daily, # 100 tab(s), 0 Refill(s), Type: Maintenance, Pharmacy: [facility] [place], 1 tab(s) po daily  Ronak Contour next test strips: Ronak Contour next test strips, See Instructions, Instructions: Testing BID, Supply, # 200 EA,  1 Refill(s), Type: Maintenance, Pharmacy: [id number], Testing BID  CONTOUR NEXT TEST Strips: CONTOUR NEXT TEST Strips, See Instructions, Instructions: USE ONE STRIP TO CHECK GLUCOSE QID  DX: E11.9, Supply, # 120 EA, 5 Refill(s), Type: Soft Stop, Pharmacy: [facility] [place], [place], USE ONE STRIP TO CHECK GLUCOSE QID; DX: E11.9  HumaLOG KwikPen 100 units/mL injectable solution: ( 4 unit(s) ), subcutaneous, tidac, # 2 syringes, 5 Refill(s), Type: Maintenance, Pharmacy: [facility] [place], Dispense 90 day supply, 4 unit(s) Subcutaneous tidac  Levemir FlexTouch 100 units/mL subcutaneous solution: See Instructions, Instructions: 36 units sc QHS, # 5 syringes, 3 Refill(s), Type: Maintenance, Pharmacy: [facility] [place], [place], 36 units sc QHS  Loratadine-D 12 Hour oral tablet, extended release: 1 tab(s), po, q12 hrs, # 180 tab(s), 1 Refill(s), Type: Maintenance, Pharmacy: [facility] [place], 1 tab(s) Oral q12 hrs  Relion Pen Needles [id number] MIS: Relion Pen Needles [id number] MIS, See Instructions, Instructions: for DM E11.9 use with insulin injection QID, Supply, # 120 EA, 5 Refill(s), Type: Maintenance, Pharmacy: [facility] [place], for DM E11.9 use with insulin injection QID  allopurinol 100 mg oral tablet: = 1 tab(s) ( 100 mg ), PO, daily, # 90 tab(s), 1 Refill(s), Type: Maintenance, Pharmacy: [facility] [place], 1 tab(s) Oral daily  atenolol-chlorthalidone 50 mg-25 mg oral tablet: 1 tab(s), po, daily, # 90 tab(s), 1 Refill(s), Type: Maintenance, Pharmacy: [facility] [place], 1 tab(s) Oral daily  betamethasone valerate 0.1% topical cream: 1 adriel, TOP, BID, # 45 g, 1 Refill(s), Type: Maintenance, Pharmacy: [facility], 1 adriel top bid  furosemide 20 mg oral tablet: = 2 tab(s) ( 40 mg ), po, daily, # 180 tab(s), 1 Refill(s), Type: Maintenance, [facility]  [place], 2 tab(s) Oral daily  lisinopril 10 mg oral tablet: = 1 tab(s) ( 10 mg ), PO, Daily, # 90 tab(s), 1 Refill(s), Type: Maintenance, Pharmacy: [facility] [place], [place], 1 tab(s) Oral daily  oxybutynin 5 mg oral tablet: = 1 tab(s) ( 5 mg ), po, bid, # 180 tab(s), 1 Refill(s), Type: Maintenance, Pharmacy: [facility] [place], 1 tab(s) Oral bid  simvastatin 40 mg oral tablet: = 1 tab(s) ( 40 mg ), po, hs, # 90 tab(s), 1 Refill(s), Type: Maintenance, Pharmacy: [facility] [place], [place], 1 tab(s) Oral hs   Problem list:    All Problems  Allergic Rhinitis / ICD-9- / Confirmed  Asthma / ICD-9-.90 / Confirmed  Basal cell carcinoma / ICD-9-.91 / Confirmed  Chronic Renal Disease, Stage III / ICD-9-.3 / Confirmed  Current use of insulin / SNOMED CT 9892735428 / Confirmed  Gout / SNOMED CT 005867851 / Confirmed  HLD (Hyperlipidemia) / SNOMED CT 773227655 / Confirmed  HTN (hypertension) / SNOMED CT 6573171070 / Confirmed  Impaired circulation of left lower extremity / SNOMED CT 50148020 / Confirmed  Incontinence / SNOMED CT 55634912 / Confirmed  Menopause / ICD-9-.2 / Confirmed  OA (Osteoarthritis) / ICD-9-.90 / Confirmed  Obesity / ICD-9-.00 / Confirmed  Overactive bladder / ICD-9-.51 / Confirmed  Type 2 diabetes mellitus / SNOMED CT 487826954 / Confirmed  Inactive: Tobacco abuse / ICD-9-.1  Resolved: *Hospitalized @ [facility] - Chest pain  Canceled: Hyperlipemia / ICD-9-.4      Histories   Past Medical History:    Active  Obesity (278.00)  Type 2 diabetes mellitus (228101685)  HTN (hypertension) (1845094223)  OA (Osteoarthritis) (715.90)  Comments:  2/1/2010 CST 2:32 PM CST - Shackleton LPN, Milena  right knee  Asthma (493.90)  Chronic Renal Disease, Stage III (585.3)  Incontinence (87483059)  Comments:  2/1/2010 CST 2:33 PM CST - Shackleton LPN, Milena  urge  Overactive bladder (596.51)  Menopause (627.2)  Allergic Rhinitis  (822)  Resolved  *Hospitalized @ [place] - Chest pain:  Resolved.   Family History:    Cancer  Grandmother (M)  Emphysema of lung  Father     Procedure history:    Anal sphincterotomy (SNOMED CT 279647728) on 2005 at 60 Years.  Comments:  2013 1:31 PM CST - Zohra Terrazas MD  Carpal tunnel release (SNOMED CT 014897112) in  at 43 Years.  Comments:  2010 10:57 AM CDT - Milena Lowry LPN  bilateral   section (SNOMED CT 95606812) in  at 34 Years.  Appendectomy (SNOMED CT 533257818) in 1952 at 7 Years.  Tonsillectomy (SNOMED CT 634883829) in 1947 at 13 Months.   Social History:        Alcohol Assessment: Denies Alcohol Use            Never      Tobacco Assessment: Past            Past      Substance Abuse Assessment            Never      Employment and Education Assessment            Retired      Home and Environment Assessment            Marital status: .  Lives with Self.  1 children.  Living situation: Home/Independent.      Exercise and Physical Activity Assessment: Does not exercise            Exercise frequency: No regular exercise..      Physical Examination   Vital signs reviewed  and within acceptable limits    Vital Signs   2019 1:55 PM CDT Peripheral Pulse Rate 71 bpm    Systolic Blood Pressure 130 mmHg    Diastolic Blood Pressure 66 mmHg    Mean Arterial Pressure 87 mmHg    Oxygen Saturation 95 %      Measurements from flowsheet : Measurements   2019 1:55 PM CDT Height Measured - Standard 62 in    Weight Measured - Standard 268 lb    BSA 2.3 m2    Body Mass Index 49.01 kg/m2  HI      General:  Alert and oriented, No acute distress, elevated BMI.    HENT:  Normocephalic.    Neck:  Supple, No lymphadenopathy, No thyromegaly.    Respiratory:  Lungs are clear to auscultation, Respirations are non-labored, Breath sounds are equal, Symmetrical chest wall expansion.    Cardiovascular:  Normal rate, Regular rhythm, No murmur, No gallop, Good  pulses equal in all extremities, Normal peripheral perfusion, No edema.    Gastrointestinal:  Soft, Non-tender, Non-distended, Normal bowel sounds, No organomegaly.    Musculoskeletal:  Normal range of motion, No swelling, No deformity.         Upper extremity exam: Shoulder ( Right, No deformity, No erythema, No ecchymosis, No swelling, Tenderness, No wound, No crepitus, No numbness, Strength  5 /5, Range of motion ( Diminished ) ).    Integumentary:  Warm, Dry, Pink, No foot ulcers or skin lesions..    Feet:  Normal by visual exam, Sensation intact, By monofilament exam.    Neurologic:  Alert, Oriented, Normal sensory, Normal motor function, No focal deficits.    Psychiatric:  Cooperative, Appropriate mood & affect.       Health Maintenance   Immunizations      Review / Management   Results review

## 2022-02-15 NOTE — TELEPHONE ENCOUNTER
Entered by Cyndee Sampson CMA on May 03, 2021 9:13:14 AM CDT  ---------------------  From: Cyndee Sampson CMA   To: [facility]    Sent: 5/3/2021 9:13:14 AM CDT  Subject: Medication Management     ** Submitted: **  Order:insulin lispro (HumaLOG KwikPen 100 units/mL injectable solution)  See Instructions  TAKE TEN UNITS SUBCUTANEOUSLY (UNDER THE SKIN) THREE TIMES DAILY BEFORE MEALS  Qty:  15 mL        Days Supply:  42        Refills:  0          Substitutions Allowed     Route To Pharmacy - [facility]    Signed by Cyndee Sampson CMA  5/3/2021 2:13:00 PM [facility]    ** Submitted: **  Complete:insulin lispro (HumaLOG KwikPen 100 units/mL injectable solution)   Signed by Cyndee Sampson CMA  5/3/2021 2:13:00 PM [facility]    ** Not Approved:  **  insulin lispro (Humalog KwikPen (U-100) Insulin 100 unit/mL subcutaneous)  TAKE TEN UNITS SUBCUTANEOUSLY (UNDER THE SKIN) THREE TIMES DAILY BEFORE MEALS  Qty:  15 mL        Days Supply:  42        Refills:  0          Substitutions Allowed     Route To Pharmacy - [facility]   Signed by Cyndee Sampson CMA            ------------------------------------------  From: [facility]  To: Thony Salazar MD  Sent: May 3, 2021 9:03:54 AM CDT  Subject: Medication Management  Due: April 27, 2021 12:41:41 AM CDT     ** On Hold Pending Signature **     Dispensed Drug: insulin lispro (HumaLOG KwikPen 100 units/mL injectable solution), TAKE TEN UNITS SUBCUTANEOUSLY (UNDER THE SKIN) THREE TIMES DAILY BEFORE MEALS  Quantity: 15 mL  Days Supply: 42  Refills: 0  Substitutions Allowed  Notes from Pharmacy:  ------------------------------------------

## 2022-02-15 NOTE — LETTER
(Inserted Image. Unable to display)     October 25, 2019      BOO ESCOBAR  [address] [place] [address] [address] [place] [address] [phone]          Dear BOO,      Thank you for selecting [facility] for your healthcare needs.      Our records indicate you are due for the following services:     Diabetic Exam ~ Please bring your glucose meter and/or your blood glucose diary to your appointment.  Hypertension check ~ please remember to bring your at-home blood pressure readings with you to your appointment.   Fasting Lab Tests ~ Please do not eat or drink anything 10 hours prior to your scheduled appointment time.  (Water and any medications that you may need are allowed unless directed otherwise.)    If you had your labs done at another facility or with Direct Access Lab Testing at [facility] access hospital, please bring in a copy of the results to your next visit, mail a copy, or drop off a copy of your results to your Healthcare Provider.      To schedule an appointment or if you have further questions, please contact your primary clinic:   [facility]       [phone]   [facility]       [phone]              [facility]     [phone]      Powered by [employer] and [employer]    Sincerely,    Thony Salazar MD

## 2022-02-15 NOTE — LETTER
(Inserted Image. Unable to display)   May 27, 2021  BOO ESCOBAR  [address] [place] [address] [address] [place] [id number]        Dear BOO,    Thank you for selecting [facility] for your healthcare needs.    Our records indicate you are due for the following services:     Diabetic Exam ~ Please bring your glucose meter and/or your blood glucose diary to your appointment.  Fasting Lab Tests ~ Please do not eat or drink anything 10 hours prior to your scheduled appointment time.  (Water and any medications that you may need are allowed unless directed otherwise.)     If you had your labs done at another facility or with Direct Access Lab Testing at [facility], please bring in a copy of the results to your next visit, mail a copy, or drop off a copy of your results to your Healthcare Provider.     (FYI   Regarding office visits: In some instances, a video visit or telephone visit may be offered as an option.)    To schedule an appointment or if you have further questions, please contact your clinic at [phone].    Powered by [employer]    Sincerely,    [facility]

## 2022-02-15 NOTE — PROGRESS NOTES
Patient:   BOO ESCOBAR            MRN: [id number]            FIN: [id number]               Age:   75 years     Sex:  Female     :  1945   Associated Diagnoses:   HTN (hypertension); HLD (Hyperlipidemia); Type 2 diabetes mellitus   Author:   Martin CH, Thony      Impression and Plan   Diagnosis     HTN (hypertension) (BIQ90-HK I10).     Course:  Improving.    Orders     Orders   Charges (Evaluation and Management):  [id number] office outpatient visit 25 minutes (Charge) (Order): Quantity: 1, HLD (Hyperlipidemia)  Type 2 diabetes mellitus  HTN (hypertension).     Orders (Selected)   Prescriptions  Prescribed  atenolol-chlorthalidone 50 mg-25 mg oral tablet: 1 tab(s), po, daily, # 90 tab(s), 1 Refill(s), Type: Maintenance, [facility]: [facility], pt due for appt in Dec, 1 tab(s) Oral daily, 62, in, 20 14:05:00 CST, Height Measured, 258.4, lb, 20 14:05:00 CST, Weight Measured  furosemide 20 mg oral tablet: = 2 tab(s) ( 40 mg ), po, daily, # 180 tab(s), 1 Refill(s), Type: Maintenance, Pharmacy: [facility], 2 tab(s) Oral daily, 62, in, 20 14:05:00 CST, Height Measured, 258.4, lb, 20 14:05:00 CST, Weight Measured.     Diagnosis     HLD (Hyperlipidemia) (TZU23-DK E78.00).     Course:  Improving.    Orders     Orders (Selected)   Prescriptions  Prescribed  simvastatin 40 mg oral tablet: = 1 tab(s) ( 40 mg ), po, hs, # 90 tab(s), 1 Refill(s), Type: Maintenance, Pharmacy: [facility], 1 tab(s) Oral hs, 62, in, 20 14:05:00 CST, Height Measured, 258.4, lb, 20 14:05:00 CST, Weight Measured.     Diagnosis     Type 2 diabetes mellitus (YTU16-RD E11.9).     Course:  Improving.    Orders     Orders (Selected)   Prescriptions  Prescribed  HumaLOG KwikPen 100 units/mL injectable solution: ( 10 unit(s) ), subcutaneous, tidac, # 15 mL, 1 Refill(s), Type: Maintenance, Pharmacy: [facility], Dispense 90 day supply, 10 units  Subcutaneous tidac, 62, in, 11/25/20 14:05:00 CST, Height Measured, 258.4, lb, 11/25/20 14:05:00 CST...  Levemir FlexTouch 100 units/mL subcutaneous solution: See Instructions, Instructions: 40 units sc QHS.....for DM II  E11.9, # 15 mL, 3 Refill(s), Type: Maintenance, Pharmacy: [facility], 40 units sc QHS.....for DM II  E11.9, 62, in, 11/25/20 14:05:00 CST, Height Measured, 258.4, lb, 11/....        Visit Information      Date of Service: 11/25/2020 01:39 pm  Performing Location: [facility]  Encounter#: [id number]      Primary Care Provider (PCP):  Thony Salazar MD# [id number]      Referring Provider:  Thony Salazar MD# [id number]   Visit type:  Scheduled follow-up.    Accompanied by:  No one.    Source of history:  Self.    History limitation:  None.       Chief Complaint   Chief complaint discussed and confirmed correct.     11/25/2020 2:05 PM CST   med check, needs refills.  also c/o neck/right shoulder pain.        History of Present Illness             The patient presents for follow-up evaluation of diabetes.  The quality of the patient's diabetes is described as being unchanged from previous visit.  Relieving factors consist of diet changes, increased activity and medication.  Associated symptoms consist of none.  Medical encounters: none.  Compliance problems: none.  Glucose results: within target range.  Hemoglobin A1c results: > 6% and within target range.  Lifestyle modification: weight reduction, diet, increased physical activity.  Medications:.  Additional pertinent history: last eye exam: 6/15/2020 and last podiatric foot exam: 11/25/2020.  Notes.               The patient presents for follow-up evaluation of hypertension.  The quality of hypertension symptom(s) since the patient's last visit is described as being unchanged.  The severity of the hypertension symptom(s) since the last visit is moderate.  Since the patient's last visit, the timing/course  of hypertension symptom(s) is constant.  Exacerbating factors consist of none.  Relieving factors consist of medication.  Associated symptoms consist of none.  Prior treatment consists of lifestyle modification (weight reduction, dietary sodium restriction, increased physical activity, adoption of DASH eating plan).  Medical encounters: none.  Compliance problems: none.        Interval History   Cholesterol Management   Total cholesterol results < 200 mg/dL (optimal).  LDL cholesterol results < 100 mg/dL (optimal).  HDL cholesterol results >40mg/dl.  Triglyceride results < 150 mg/dL (normal).  The course is progressing as expected.  The effect on daily activities is no change in activity level and no change in eating habits.  Associated symptoms characterized by no fatigue, chest pain, joint pain, muscle weakness or myalgias.        Review of Systems   /   Constitutional:  Negative.    Eye:  Negative.    Ear/Nose/Mouth/Throat:  Nasal congestion.    Respiratory:  Cough, chest wall pain with cough.    Cardiovascular:  Negative.    Gastrointestinal:  Negative.    Genitourinary:  Negative.    Hematology/Lymphatics:  Negative.    Endocrine:  Negative.    Immunologic:  Negative.    Musculoskeletal:  Back pain.    Integumentary   Neurologic:  Negative.    Psychiatric:  Negative.    All other systems reviewed and negative      Health Status   Allergies:    Allergic Reactions (Selected)  Moderate  Neosporin (Skin rash)   Medications:  (Selected)   Prescriptions  Prescribed  Alavert D-12 oral tablet, extended release: 1 tab(s), Oral, q12 hrs, # 180 tab(s), 1 Refill(s), Type: Maintenance, Pharmacy: [facility], 1 tab(s) Oral q12 hrs, 62, in, 11/25/20 14:05:00 CST, Height Measured, 258.4, lb, 11/25/20 14:05:00 CST, Weight Measured  Aspir 81 oral delayed release tablet: 1 tab(s) ( 81 mg ), PO, Daily, # 100 tab(s), 0 Refill(s), Type: Maintenance, [facility]: [facility] [place] [place], 1 tab(s) po  daily  Ronak Contour next test strips: Ronak Contour next test strips, See Instructions, Instructions: Testing BID, Supply, # 200 EA, 1 Refill(s), Type: Maintenance, Pharmacy: [facility], Testing BID  CONTOUR NEXT TEST Strips: CONTOUR NEXT TEST Strips, See Instructions, Instructions: USE ONE STRIP TO CHECK GLUCOSE BID  DX: E11.9, Supply, # 120 EA, 5 Refill(s), Type: Soft Stop, Pharmacy: [facility], USE ONE STRIP TO CHECK GLUCOSE BID; DX: E11.9, 62, in, 05/...  HumaLOG KwikPen 100 units/mL injectable solution: ( 10 unit(s) ), subcutaneous, tidac, # 15 mL, 1 Refill(s), Type: Maintenance, Pharmacy: [facility], Dispense 90 day supply, 10 units Subcutaneous tidac, 62, in, 11/25/20 14:05:00 CST, Height Measured, 258.4, lb, 11/25/20 14:05:00 CST...  Levemir FlexTouch 100 units/mL subcutaneous solution: See Instructions, Instructions: 40 units sc QHS.....for DM II  E11.9, # 15 mL, 3 Refill(s), Type: Maintenance, Pharmacy: [facility], 40 units sc QHS.....for DM II  E11.9, 62, in, 11/25/20 14:05:00 CST, Height Measured, 258.4, lb, 11/...  Relion Pen Needles [id number] MIS: Relion Pen Needles [id number] MIS, See Instructions, Instructions: for DM E11.9 use with insulin injection QID, Supply, # 120 EA, 5 Refill(s), Type: Maintenance, Pharmacy: [facility], for DM E11.9 use with insulin injection QID, 62, in,...  allopurinol 100 mg oral tablet: = 1 tab(s) ( 100 mg ), PO, daily, # 90 tab(s), 1 Refill(s), Type: Maintenance, Pharmacy: [facility], 1 tab(s) Oral daily, 62, in, 11/25/20 14:05:00 CST, Height Measured, 258.4, lb, 11/25/20 14:05:00 CST, Weight Measured  atenolol-chlorthalidone 50 mg-25 mg oral tablet: 1 tab(s), po, daily, # 90 tab(s), 1 Refill(s), Type: Maintenance, Pharmacy: [facility], pt due for appt in Dec, 1 tab(s) Oral daily, 62, in, 11/25/20 14:05:00 CST, Height Measured, 258.4, lb, 11/25/20 14:05:00  CST, Weight Measured  betamethasone valerate 0.1% topical cream: 1 adriel, TOP, BID, # 45 g, 1 Refill(s), Type: Maintenance, Pharmacy: [facility], 1 adriel Topical bid, 62, in, 11/25/20 14:05:00 CST, Height Measured, 258.4, lb, 11/25/20 14:05:00 CST, Weight Measured  furosemide 20 mg oral tablet: = 2 tab(s) ( 40 mg ), po, daily, # 180 tab(s), 1 Refill(s), Type: Maintenance, Pharmacy: [facility], 2 tab(s) Oral daily, 62, in, 11/25/20 14:05:00 CST, Height Measured, 258.4, lb, 11/25/20 14:05:00 CST, Weight Measured  simvastatin 40 mg oral tablet: = 1 tab(s) ( 40 mg ), po, hs, # 90 tab(s), 1 Refill(s), Type: Maintenance, Pharmacy: [facility], 1 tab(s) Oral hs, 62, in, 11/25/20 14:05:00 CST, Height Measured, 258.4, lb, 11/25/20 14:05:00 CST, Weight Measured   Problem list:    All Problems  Allergic Rhinitis / ICD-9- / Confirmed  Asthma / ICD-9-.90 / Confirmed  Basal cell carcinoma / ICD-9-.91 / Confirmed  Chronic Renal Disease, Stage III / ICD-9-.3 / Confirmed  Current use of insulin / SNOMED CT 6248158755 / Confirmed  Gout / SNOMED CT 873858245 / Confirmed  HLD (Hyperlipidemia) / SNOMED CT 344182971 / Confirmed  HTN (hypertension) / SNOMED CT 5762258843 / Confirmed  Impaired circulation of left lower extremity / SNOMED CT 63493936 / Confirmed  Incontinence / SNOMED CT 13873673 / Confirmed  Menopause / ICD-9-.2 / Confirmed  OA (Osteoarthritis) / ICD-9-.90 / Confirmed  Obesity / ICD-9-.00 / Confirmed  Overactive bladder / ICD-9-.51 / Confirmed  Type 2 diabetes mellitus / SNOMED CT 756697453 / Confirmed  Inactive: Tobacco abuse / ICD-9-.1  Resolved: *Hospitalized @ [facility] - Chest pain  Canceled: Hyperlipemia / ICD-9-.4      Histories   Past Medical History:    Active  Obesity (278.00)  Type 2 diabetes mellitus (719391221)  HTN (hypertension) (0985196316)  OA (Osteoarthritis) (715.90)  Comments:  2/1/2010 CST  2:32 PM CST - Milena Lowry LPN  right knee  Asthma (493.90)  Chronic Renal Disease, Stage III (585.3)  Incontinence (98657834)  Comments:  2010 CST 2:33 PM CST - Shackleton LPN, Milena  urge  Overactive bladder (596.51)  Menopause (627.2)  Allergic Rhinitis (477)  Resolved  *Hospitalized @ [facility] - Chest pain:  Resolved.   Family History:    Cancer  Grandmother (M)  Emphysema of lung  Father     Procedure history:    Anal sphincterotomy (SNOMED CT 509125703) on 2005 at 60 Years.  Comments:  2013 1:31 PM CST - Zohra Terrazas MD  Carpal tunnel release (SNOMED CT 980215032) in  at 43 Years.  Comments:  2010 10:57 AM CDT - Milena Lowry LPN  bilateral   section (SNOMED CT 49388219) in  at 34 Years.  Appendectomy (SNOMED CT 651180729) in  at 7 Years.  Tonsillectomy (SNOMED CT 791718177) in 1947 at 13 Months.   Social History:        Electronic Cigarette/Vaping Assessment            Electronic Cigarette Use: Never.      Alcohol Assessment: Denies Alcohol Use            Never      Tobacco Assessment: Past            Former smoker, quit more than 30 days ago      Substance Abuse Assessment            Never      Employment and Education Assessment            Retired      Home and Environment Assessment            Marital status: .  Lives with Self.  1 children.  Living situation: Home/Independent.      Exercise and Physical Activity Assessment: Does not exercise            Exercise frequency: No regular exercise..        Physical Examination   Vital signs reviewed  and within acceptable limits    Vital Signs   2020 2:05 PM CST Peripheral Pulse Rate 69 bpm    Pulse Site Radial artery    HR Method Manual    Systolic Blood Pressure 124 mmHg    Diastolic Blood Pressure 74 mmHg    Mean Arterial Pressure 91 mmHg    BP Site Right arm    BP Method Manual    Oxygen Saturation 94 %      Measurements from flowsheet : Measurements   2020 2:05 PM  CST Height Measured - Standard 62 in    Weight Measured - Standard 258.4 lb    BSA 2.26 m2    Body Mass Index 47.26 kg/m2  HI      General:  Alert and oriented, No acute distress, elevated BMI.    HENT:  Normocephalic.    Neck:  Supple, No lymphadenopathy, No thyromegaly.    Respiratory:  Lungs are clear to auscultation, Respirations are non-labored, Breath sounds are equal, Symmetrical chest wall expansion.    Cardiovascular:  Normal rate, Regular rhythm, No murmur, No gallop, Good pulses equal in all extremities, Normal peripheral perfusion, No edema.    Gastrointestinal:  Soft, Non-tender, Non-distended, Normal bowel sounds, No organomegaly.    Musculoskeletal:  Normal range of motion, No swelling, No deformity.         Upper extremity exam: Shoulder ( Right, No deformity, No erythema, No ecchymosis, No swelling, Tenderness, No wound, No crepitus, No numbness, Strength  5 /5, Range of motion ( Diminished ) ).    Integumentary:  Warm, Dry, Pink, No foot ulcers or skin lesions..    Feet:  Normal by visual exam, Sensation intact, By monofilament exam.    Neurologic:  Alert, Oriented, Normal sensory, Normal motor function, No focal deficits.    Psychiatric:  Cooperative, Appropriate mood & affect.       Health Maintenance   Immunizations      Review / Management   Results review

## 2022-02-15 NOTE — CARE COORDINATION
Pt appears on JOE chronic disease panel as out of parameters for A1C.  Pt was seen 10/1/2018, had A1C done at [place] 10/1/2018 A1C was 7.9.  Pt has RTC for 6 months (4/2019)

## 2022-02-15 NOTE — NURSING NOTE
CAGE Assessment Entered On:  11/25/2020 2:42 PM CST    Performed On:  11/25/2020 2:42 PM CST by Zohra Gibbs MA               Assessment   Have you ever felt you should cut down on your drinking :   No   Have people annoyed you by criticizing your drinking :   No   Have you ever felt bad or guilty about your drinking :   No   Have you ever taken a drink first thing in the morning to steady your nerves or get rid of a hangover (Eye-opener) :   No   CAGE Score :   0    Zohra Gibbs MA - 11/25/2020 2:42 PM CST

## 2022-02-15 NOTE — PROGRESS NOTES
Patient:   BOO ESCOBAR            MRN: [id number]            FIN: [id number]               Age:   74 years     Sex:  Female     :  1945   Associated Diagnoses:   None   Author:   Thony Salazar MD      Impression and Plan   Diagnosis   Course:  Worsening.    Plan:  1. Rest for 3 days  2.Cyclobenzaprine PRN for muscle spasm  3. NSAID PRN for pain  4. Follow up in 1-2 weeks if not improving.    Orders     Orders   Charges (Evaluation and Management):  93608 office outpatient visit 15 minutes (Charge) (Order): Quantity: 1, Acute lumbar back pain.     Orders (Selected)   Prescriptions  Prescribed  cyclobenzaprine 10 mg oral tablet: = 1 tab(s) ( 10 mg ), PO, q6 hrs, PRN: for spasm, # 20 tab(s), 0 Refill(s), Type: Maintenance, Pharmacy: [facility], 1 tab(s) Oral q6 hrs,PRN:for spasm  ibuprofen 600 mg oral tablet: = 1 tab(s) ( 600 mg ), Oral, q6 hrs, PRN: for pain, # 40 tab(s), 0 Refill(s), Type: Acute, Pharmacy: [facility], 1 tab(s) Oral q6 hrs,PRN:for pain.        Visit Information      Date of Service: 08/15/2019 01:33 pm  Performing Location: [facility]  Encounter#: [id number]      Primary Care Provider (PCP):  Thony Salazar MD    NPI# [id number]   Visit type:  New symptom.    Accompanied by:  No one.    Source of history:  Self.    History limitation:  None.       Chief Complaint   Chief complaint discussed and confirmed correct.     8/15/2019 1:39 PM CDT    Pt here for back pain        History of Present Illness             The patient presents with back pain.  The back pain is located on the right and lumbar region.  The back pain is described as aching.  The severity of the back pain is moderate.  The back pain only with movements.  The back pain has lasted for 1 day(s).  Radiation of pain: none.  The context of the back pain: occurred after exertion and straning at the stool.  Exacerbating factors consist of movement.  Relieving factors  consist of analgesics and rest.  Associated symptoms consist of none.  Prior treatment consists of none.        Review of Systems   Constitutional:  Negative.    Eye:  Negative.    Ear/Nose/Mouth/Throat:  Negative.    Respiratory:  Negative.    Cardiovascular:  Negative.    Gastrointestinal:  Negative.    Genitourinary:  Negative.    Hematology/Lymphatics:  Negative.    Endocrine:  Negative.    Immunologic:  Negative.    Musculoskeletal:  Back pain.    Integumentary:  Negative.    Neurologic:  Negative.    Psychiatric:  Negative.    All other systems reviewed and negative      Health Status   Allergies:    Allergic Reactions (Selected)  Moderate  Neosporin (Skin rash)   Medications:  (Selected)   Prescriptions  Prescribed  Alavert D-12 oral tablet, extended release: 1 tab(s), Oral, q12 hrs, # 180 tab(s), 1 Refill(s), Type: Maintenance, Pharmacy: [facility] [place] [place], 1 tab(s) Oral q12 hrs  Aspir 81 oral delayed release tablet: 1 tab(s) ( 81 mg ), PO, Daily, # 100 tab(s), 0 Refill(s), Type: Maintenance, Pharmacy: [facility] [place] [place], 1 tab(s) po daily  Ronak Contour next test strips: Ronak Contour next test strips, See Instructions, Instructions: Testing BID, Supply, # 200 EA, 1 Refill(s), Type: Maintenance, Pharmacy: [facility], Testing BID  CONTOUR NEXT TEST Strips: CONTOUR NEXT TEST Strips, See Instructions, Instructions: USE ONE STRIP TO CHECK GLUCOSE QID  DX: E11.9, Supply, # 120 EA, 5 Refill(s), Type: Soft Stop, Pharmacy: [facility] [place] [place], USE ONE STRIP TO CHECK GLUCOSE QID; DX: E11.9  HumaLOG KwikPen 100 units/mL injectable solution: ( 5 unit(s) ), subcutaneous, tidac, # 2 syringes, 5 Refill(s), Type: Maintenance, Pharmacy: [facility] [place] [place], Dispense 90 day supply, 5 unit(s) Subcutaneous tidac  Levemir FlexTouch 100 units/mL subcutaneous solution: See Instructions, Instructions: 40 units sc QHS, # 5 syringes, 3 Refill(s), Type:  Maintenance, Pharmacy: [facility] [place] [place]  Relion Pen [place] [id number] MIS: Relion Pen Needles [id number] MIS, See Instructions, Instructions: for DM E11.9 use with insulin injection QID, Supply, # 120 EA, 5 Refill(s), Type: Maintenance, Pharmacy: [facility] [place] [place], for DM E11.9 use with insulin injection QID  allopurinol 100 mg oral tablet: = 1 tab(s) ( 100 mg ), PO, daily, # 90 tab(s), 1 Refill(s), Type: Maintenance, Pharmacy: [facility] [place] [place], 1 tab(s) Oral daily  atenolol-chlorthalidone 50 mg-25 mg oral tablet: 1 tab(s), po, daily, # 90 tab(s), 1 Refill(s), Type: Maintenance, Pharmacy: [facility] [place] [place], 1 tab(s) Oral daily  betamethasone valerate 0.1% topical cream: 1 adriel, TOP, BID, # 45 g, 1 Refill(s), Type: Maintenance, Pharmacy: [facility] [place] [place], 1 adriel top bid  cyclobenzaprine 10 mg oral tablet: = 1 tab(s) ( 10 mg ), PO, q6 hrs, PRN: for spasm, # 20 tab(s), 0 Refill(s), Type: Maintenance, Pharmacy: [facility] [place] [place], 1 tab(s) Oral q6 hrs,PRN:for spasm  furosemide 20 mg oral tablet: = 2 tab(s) ( 40 mg ), po, daily, # 180 tab(s), 1 Refill(s), Type: Maintenance, Pharmacy: [facility] [place] [place], 2 tab(s) Oral daily  ibuprofen 600 mg oral tablet: = 1 tab(s) ( 600 mg ), Oral, q6 hrs, PRN: for pain, # 40 tab(s), 0 Refill(s), Type: Acute, Pharmacy: [facility] [place] [place], 1 tab(s) Oral q6 hrs,PRN:for pain  oxybutynin 5 mg oral tablet: = 1 tab(s) ( 5 mg ), po, bid, # 180 tab(s), 1 Refill(s), Type: Maintenance, [facility], 1 tab(s) Oral bid  simvastatin 40 mg oral tablet: = 1 tab(s) ( 40 mg ), po, hs, # 90 tab(s), 1 Refill(s), Type: Maintenance, [facility]: [facility], 1 tab(s) Oral hs   Problem list:    All Problems  Allergic Rhinitis / ICD-9- / Confirmed  Asthma / ICD-9-.90 / Confirmed  Basal cell carcinoma / ICD-9-CM  173.91 / Confirmed  Chronic Renal Disease, Stage III / ICD-9-.3 / Confirmed  Current use of insulin / SNOMED CT 3540912305 / Confirmed  Gout / SNOMED CT 603416663 / Confirmed  HLD (Hyperlipidemia) / SNOMED CT 442679615 / Confirmed  HTN (hypertension) / SNOMED CT 1166170003 / Confirmed  Impaired circulation of left lower extremity / SNOMED CT 25619021 / Confirmed  Incontinence / SNOMED CT 14355036 / Confirmed  Menopause / ICD-9-.2 / Confirmed  OA (Osteoarthritis) / ICD-9-.90 / Confirmed  Obesity / ICD-9-.00 / Confirmed  Overactive bladder / ICD-9-.51 / Confirmed  Type 2 diabetes mellitus / SNOMED CT 583348835 / Confirmed  Inactive: Tobacco abuse / ICD-9-.1  Resolved: *Hospitalized @ [facility] Chest pain  Canceled: Hyperlipemia / ICD-9-.4      Histories   Past Medical History:    Active  Obesity (278.00)  Type 2 diabetes mellitus (924812069)  HTN (hypertension) (4650206482)  OA (Osteoarthritis) (715.90)  Comments:  2010 CST 2:32 PM CST - Milena Lowry LPN  right knee  Asthma (493.90)  Chronic Renal Disease, Stage III (585.3)  Incontinence (57861529)  Comments:  2010 CST 2:33 PM CST - Shackleton LPN, Milena  urge  Overactive bladder (596.51)  Menopause (627.2)  Allergic Rhinitis (477)  Resolved  *Hospitalized @ [facility] - Chest pain:  Resolved.   Family History:    Cancer  Grandmother (M)  Emphysema of lung  Father     Procedure history:    Anal sphincterotomy (SNOMED CT 542171154) on 2005 at 60 Years.  Comments:  2013 1:31 PM JIHAN - Zohra Terrazas MD  Carpal tunnel release (SNOMED CT 262802049) in  at 43 Years.  Comments:  2010 10:57 AM CDT - Milena Lowry LPN  bilateral   section (SNOMED CT 60761682) in  at 34 Years.  Appendectomy (SNOMED CT 089970047) in 1952 at 7 Years.  Tonsillectomy (SNOMED CT 737019822) in 1947 at 13 Months.   Social History:        Alcohol Assessment: Denies Alcohol Use             Never      Tobacco Assessment: Past            Past      Substance Abuse Assessment            Never      Employment and Education Assessment            Retired      Home and Environment Assessment            Marital status: .  Lives with Self.  1 children.  Living situation: Home/Independent.      Exercise and Physical Activity Assessment: Does not exercise            Exercise frequency: No regular exercise..        Physical Examination   Vital signs reviewed  and within acceptable limits    Vital Signs   8/15/2019 1:39 PM CDT Temperature Tympanic 97.4 DegF  LOW    Peripheral Pulse Rate 70 bpm    Systolic Blood Pressure 130 mmHg    Diastolic Blood Pressure 74 mmHg    Mean Arterial Pressure 93 mmHg    Oxygen Saturation 95 %      Measurements from flowsheet : Measurements   8/15/2019 1:39 PM CDT Height Measured - Standard 62 in    Weight Measured - Standard 268 lb    BSA 2.3 m2    Body Mass Index 49.01 kg/m2  HI      General:  Mild distress.    Respiratory:  Lungs are clear to auscultation, Respirations are non-labored.    Cardiovascular:  Normal rate, Regular rhythm.    Musculoskeletal:  Normal gait, Normal station.         Spine/torso exam: Lumbar ( Bilateral, No deformity, No erythema, No ecchymosis, No swelling, Tenderness, Range of motion ( Diminished ), Straight leg raising, supine ( Negative ), mild tenderness to palpation of the right lumbar area ).    Integumentary:  Warm, Dry, Pink.    Neurologic:  Alert, Oriented, Normal sensory, Normal motor function, No focal deficits, Normal deep tendon reflexes.    Psychiatric:  Cooperative, Appropriate mood & affect, Normal judgment.

## 2022-02-15 NOTE — PROGRESS NOTES
Patient:   BOO ESCOBAR            MRN: [id number]            FIN: [id number]               Age:   72 years     Sex:  Female     :  1945   Associated Diagnoses:   HTN (hypertension); HLD (Hyperlipidemia); Type 2 diabetes mellitus   Author:   Martin CH, Thony      Impression and Plan   Diagnosis     HTN (hypertension) (GEH49-RN I10).     Course:  Well controlled.    Orders     Orders   Charges (Evaluation and Management):  04787 office outpatient visit 25 minutes (Charge) (Order): Quantity: 1, HLD (Hyperlipidemia)  Type 2 diabetes mellitus  Immunization due  HTN (hypertension).     Orders (Selected)   Prescriptions  Prescribed  atenolol-chlorthalidone 50 mg-25 mg oral tablet: 1 tab(s), po, daily, # 90 tab(s), 1 Refill(s), Type: Maintenance, Pharmacy: [facility] , 1 tab(s) po daily  furosemide 20 mg oral tablet: 1 tab(s) ( 20 mg ), po, daily, # 90 tab(s), 1 Refill(s), Type: Maintenance, Pharmacy: [facility] , 1 tab(s) po daily  lisinopril 10 mg oral tablet: 1 tab(s) ( 10 mg ), PO, Daily, # 90 tab(s), 1 Refill(s), Type: Maintenance, Pharmacy: [facility] , 1 tab(s) po daily.     Diagnosis     HLD (Hyperlipidemia) (MSH74-HR E78.0).     Course:  Progressing as expected.    Orders     Orders (Selected)   Prescriptions  Prescribed  simvastatin 40 mg oral tablet: 1 tab(s) ( 40 mg ), po, hs, # 90 tab(s), 1 Refill(s), Type: Maintenance, Pharmacy: [facility] , 1 tab(s) po hs.     Diagnosis     Type 2 diabetes mellitus (FKO23-YL E11.9).     Course:  Well controlled.    Orders     Orders (Selected)   Prescriptions  Prescribed  Humalog Pen 100 units/mL subcutaneous solution: ( 8 unit(s) ), Subcutaneous, TIDAC, # 15 mL, 5 Refill(s), Type: Maintenance, Pharmacy: [facility] , 8 unit(s) subcutaneous tidac  Levemir FlexTouch 100 units/mL subcutaneous solution: See Instructions, Instructions: 36 units sc QHS, # 45 mL, 5 Refill(s), Type: Maintenance, Pharmacy: [facility] [place]  [facility], 36 units [facility].        Visit Information      Date of Service: 11/06/2017 10:40 am  Performing Location: [facility]  Encounter#: [id number]      Primary Care Provider (PCP):  Thony Salazar MD# [id number]      Referring Provider:  Thony Salazar MD# [id number]   Visit type:  Scheduled follow-up.    Accompanied by:  No one.    Source of history:  Self.    History limitation:  None.       Chief Complaint   Chief complaint discussed and confirmed correct.     11/6/2017 10:55 AM CST   Pt here for med ck        History of Present Illness             The patient presents for follow-up evaluation of diabetes.  The quality of the patient's diabetes is described as being unchanged from previous visit.  Relieving factors consist of diet changes, increased activity and medication.  Associated symptoms consist of none.  Medical encounters: none.  Compliance problems: none.  Glucose results: within target range.  Hemoglobin A1c results: > 6% and within target range.  Lifestyle modification: weight reduction, diet, increased physical activity.  Medications:.  Additional pertinent history: last eye exam: 11/15/2016, last podiatric foot exam: 11/6/2017 and eye exam recommended.  No reported episodes of hypoglycemia.               The patient presents for follow-up evaluation of hypertension.  The quality of hypertension symptom(s) since the patient's last visit is described as being unchanged.  The severity of the hypertension symptom(s) since the last visit is moderate.  Since the patient's last visit, the timing/course of hypertension symptom(s) is constant.  Exacerbating factors consist of none.  Relieving factors consist of medication.  Associated symptoms consist of none.  Prior treatment consists of lifestyle modification (weight reduction, dietary sodium restriction, increased physical activity, adoption of DASH eating plan).  Medical encounters: none.  Compliance problems:  none.        Interval History   Cholesterol Management   Total cholesterol results < 200 mg/dL (optimal).  LDL cholesterol results < 100 mg/dL (optimal).  HDL cholesterol results >40mg/dl.  Triglyceride results < 150 mg/dL (normal).  The course is progressing as expected.  The effect on daily activities is no change in activity level and no change in eating habits.  Associated symptoms characterized by no fatigue, chest pain, joint pain, muscle weakness or myalgias.        Review of Systems   /   Constitutional:  Negative.    Eye:  Negative.    Ear/Nose/Mouth/Throat:  Negative.    Respiratory:  Negative.    Cardiovascular:  Negative.    Gastrointestinal:  Negative.    Genitourinary:  Negative.    Hematology/Lymphatics:  Negative.    Endocrine:  Negative.    Immunologic:  Negative.    Musculoskeletal:  Back pain.    Integumentary   Neurologic:  Negative.    Psychiatric:  Negative.    All other systems reviewed and negative      Health Status   Allergies:    Allergic Reactions (Selected)  Moderate  Neosporin (Skin rash)   Medications:  (Selected)   Prescriptions  Prescribed  Any brand Microfine 6 mm 31 g  Pen needles: Any brand Microfine 6 mm 31 g  Pen needles, See Instructions, Instructions: use 1 needle daily, Supply, # 3 box(es), 3 Refill(s), Type: Maintenance, Pharmacy: [facility], this is to replace rx for [facility] needles, use 1 needle daily  Aspir 81 oral enteric coated tablet: 1 tab(s) ( 81 mg ), PO, Daily, # 100 tab(s), 0 Refill(s), Type: Maintenance, OTC (Rx)  Ronak Contour next test strips: Ronak Contour next test strips, See Instructions, Instructions: Testing BID, Supply, # 200 EA, 1 Refill(s), Type: Maintenance, Pharmacy: [facility], Testing BID  CONTOUR NEXT        MATT: CONTOUR NEXT        MATT, See Instructions, Instructions: USE ONE STRIP TO CHECK GLUCOSE TWICE DAILY, Supply, # 180 EA, 1 Refill(s), Type: Soft Stop, Pharmacy: [facility], USE ONE STRIP TO CHECK  GLUCOSE TWICE DAILY  Humalog Pen 100 units/mL subcutaneous solution: ( 8 unit(s) ), Subcutaneous, TIDAC, # 15 mL, 5 Refill(s), Type: Maintenance, Pharmacy: [facility]19, 8 unit(s) subcutaneous tidac  Levemir FlexTouch 100 units/mL subcutaneous solution: See Instructions, Instructions: 36 units sc QHS, # 45 mL, 5 Refill(s), Type: Maintenance, Pharmacy: [facility]19, 36 units sc QHS  Loratadine-D 12 Hour oral tablet, extended release: 1 tab(s), po, q12 hrs, # 180 tab(s), 1 Refill(s), Type: Maintenance  allopurinol 100 mg oral tablet: 1 tab(s) ( 100 mg ), PO, daily, # 90 tab(s), 1 Refill(s), Type: Maintenance, Pharmacy: [facility] 1819, 1 tab(s) po daily  atenolol-chlorthalidone 50 mg-25 mg oral tablet: 1 tab(s), po, daily, # 90 tab(s), 1 Refill(s), Type: Maintenance, Pharmacy: [facility]19, 1 tab(s) po daily  betamethasone valerate 0.1% topical cream: 1 adriel, TOP, BID, # 45 g, 0 Refill(s), Type: Maintenance, Pharmacy: [place] [id number], 1 adriel top bid  cholecalciferol 2000 intl units oral tablet: 1 tab(s) ( 2,000 International Unit ), po, daily, # 30 tab(s), 5 Refill(s), Type: Maintenance, Pharmacy: [facility]19, 1 tab(s) po daily  furosemide 20 mg oral tablet: 1 tab(s) ( 20 mg ), po, daily, # 90 tab(s), 1 Refill(s), Type: Maintenance, Pharmacy: [facility] 1819, 1 tab(s) po daily  lisinopril 10 mg oral tablet: 1 tab(s) ( 10 mg ), PO, Daily, # 90 tab(s), 1 Refill(s), Type: Maintenance, Pharmacy: [place] [id number], 1 tab(s) po daily  oxybutynin 5 mg oral tablet: 1 tab(s) ( 5 mg ), po, bid, # 180 tab(s), 1 Refill(s), Type: Maintenance, Pharmacy: [facility], 1 tab(s) po bid  simvastatin 40 mg oral tablet: 1 tab(s) ( 40 mg ), po, hs, # 90 tab(s), 1 Refill(s), Type: Maintenance, Pharmacy: [facility], 1 tab(s) po hs   Problem list:    All Problems  Allergic Rhinitis / ICD-9- / Confirmed  Asthma / ICD-9-.90 / Confirmed  Basal  cell carcinoma / ICD-9-.91 / Confirmed  Chronic Renal Disease, Stage III / ICD-9-.3 / Confirmed  Gout / SNOMED CT 292578170 / Confirmed  HLD (Hyperlipidemia) / SNOMED CT 948800399 / Confirmed  HTN (hypertension) / SNOMED CT 7124834516 / Confirmed  Impaired circulation of left lower extremity / SNOMED CT 02078714 / Confirmed  Incontinence / SNOMED CT 92144431 / Confirmed  Menopause / ICD-9-.2 / Confirmed  OA (Osteoarthritis) / ICD-9-.90 / Confirmed  Obesity / ICD-9-.00 / Confirmed  Overactive bladder / ICD-9-.51 / Confirmed  Type 2 diabetes mellitus / SNOMED CT 092417470 / Confirmed  Inactive: Tobacco abuse / ICD-9-.1  Resolved: *Hospitalized @ [facility] Chest pain  Canceled: Hyperlipemia / ICD-9-.4      Histories   Past Medical History:    Active  Obesity (278.00)  Type 2 diabetes mellitus (199068058)  HTN (hypertension) (8702506591)  OA (Osteoarthritis) (715.90)  Comments:  2010 CST 2:32 PM CST - Milena Lowry LPN  right knee  Asthma (493.90)  Chronic Renal Disease, Stage III (585.3)  Incontinence (74796577)  Comments:  2010 CST 2:33 PM CST - Shackleton LPN, Milena  urge  Overactive bladder (596.51)  Menopause (627.2)  Allergic Rhinitis (477)  Resolved  *Hospitalized @ [facility] - Chest pain:  Resolved.   Family History:    Cancer  Grandmother (M)  Emphysema of lung  Father     Procedure history:    Anal sphincterotomy (SNOMED CT 754145253) on 2005 at 60 Years.  Comments:  2013 1:31 PM - Zohra Terrazas MD  Carpal tunnel release (SNOMED CT 910286885) in  at 43 Years.  Comments:  2010 10:57 AM - Milena Lowry LPN  bilateral   section (SNOMED CT 63865776) in  at 34 Years.  Appendectomy (SNOMED CT 063823672) in  at 7 Years.  Tonsillectomy (SNOMED CT 414838870) in 1947 at 13 Months.   Social History:        Alcohol Assessment: Denies Alcohol Use            Never      Tobacco Assessment: Past             Past      Substance Abuse Assessment            Never      Employment and Education Assessment            Retired      Home and Environment Assessment            Marital status: .  Lives with Self.  1 children.  Living situation: Home/Independent.      Exercise and Physical Activity Assessment: Does not exercise            Exercise frequency: No regular exercise..        Physical Examination   Vital signs reviewed  and within acceptable limits    Vital Signs   11/6/2017 10:55 AM CST Peripheral Pulse Rate 71 bpm    Pulse Site Radial artery    Systolic Blood Pressure 124 mmHg    Diastolic Blood Pressure 70 mmHg    Mean Arterial Pressure 88 mmHg    BP Site Right arm    Oxygen Saturation 91 %  LOW      Measurements from flowsheet : Measurements   11/6/2017 10:55 AM CST Height Measured - Standard 62 in    Weight Measured - Standard 258.8 lb    BSA 2.26 m2    Body Mass Index 47.33 kg/m2      General:  Alert and oriented, No acute distress, elevated BMI.    HENT:  Normocephalic.    Neck:  Supple, No lymphadenopathy, No thyromegaly.    Respiratory:  Lungs are clear to auscultation, Respirations are non-labored, Breath sounds are equal, Symmetrical chest wall expansion.    Cardiovascular:  Normal rate, Regular rhythm, No murmur, No gallop, Good pulses equal in all extremities, Normal peripheral perfusion, No edema.    Gastrointestinal:  Soft, Non-tender, Non-distended, Normal bowel sounds, No organomegaly.    Musculoskeletal:  Normal range of motion, No swelling, No deformity.         Upper extremity exam: Shoulder ( Right, No deformity, No erythema, No ecchymosis, No swelling, Tenderness, No wound, No crepitus, No numbness, Strength  5 /5, Range of motion ( Diminished ) ).    Integumentary:  Warm, Dry, Pink, No foot ulcers or skin lesions..    Feet:  Normal by visual exam, Sensation intact, By monofilament exam.    Neurologic:  Alert, Oriented, Normal sensory, Normal motor function, No focal deficits.     Psychiatric:  Cooperative, Appropriate mood & affect.       Health Maintenance   Immunizations      Review / Management   Results review

## 2022-02-15 NOTE — PROGRESS NOTES
Patient:   BOO ESCOBAR            MRN: [id number]            FIN: [id number]               Age:   72 years     Sex:  Female     :  1945   Associated Diagnoses:   Trigger finger   Author:   Thony Salazar MD      Impression and Plan   Diagnosis     Trigger finger (ZTY68-JN M65.30).     Condition:  Stable, no contraindication for general anesthesia or surgical procedure..    Orders     Orders   Charges (Evaluation and Management):  04221 office outpatient visit 25 minutes (Charge) (Order): Quantity: 1, Trigger finger.     Orders (Selected)   Outpatient Orders  Completed   ekg interpret + report preve (Charge): Quantity: 1, Trigger finger.        Preoperative Information   Accompanied by:  No one.    Source of history:  Self.    History limitation:  None.       Chief Complaint   Chief complaint discussed and confirmed correct.     3/26/2018 1:08 PM CDT    Pt here for pre op DOS 18 at [facility] with Dr Duval for trigger fingers        Review of Systems   Constitutional:  Negative.    Eye:  Negative.    Ear/Nose/Mouth/Throat:  Negative.    Respiratory:  Negative.    Cardiovascular:  Negative.    Gastrointestinal:  Negative.    Genitourinary:  Negative.    Hematology/Lymphatics:  Negative.    Endocrine:  Negative.    Immunologic:  Negative.    Musculoskeletal:  Negative.    Integumentary:  Negative.    Neurologic:  Negative.    Psychiatric:  Negative.    All other systems reviewed and negative   No personal or family history of anesthesia reactions  No history of bleeding or blood clotting disorders  No history of heart murmur or need for prophylactic antibiotics  No history of blood transfusion  No history of recent infectious contacts       Health Status   Allergies:    Allergic Reactions (Selected)  Moderate  Neosporin (Skin rash)   Medications:  (Selected)   Prescriptions  Prescribed  Aspir 81 oral enteric coated tablet: 1 tab(s) ( 81 mg ), PO, Daily, # 100 tab(s), 0 Refill(s), Type:  Maintenance, OTC (Rx)  Ronak Contour next test strips: Ronak Contour next test strips, See Instructions, Instructions: Testing BID, Supply, # 200 EA, 1 Refill(s), Type: Maintenance, Pharmacy: [facility], Testing BID  CONTOUR NEXT TEST Strips: CONTOUR NEXT TEST Strips, See Instructions, Instructions: USE ONE STRIP TO CHECK GLUCOSE TWICE DAILY  DX: E11.9, Supply, # 180 EA, 1 Refill(s), Type: Soft Stop, USE ONE STRIP TO CHECK GLUCOSE TWICE DAILY; DX: E11.9  HumaLOG KwikPen 100 units/mL injectable solution: ( 4 unit(s) ), subcutaneous, tidac, # 5 syringes, 3 Refill(s), Type: Maintenance, Pharmacy: [facility], Dispense 90 day supply, 4 unit(s) subcutaneous tidac  Levemir FlexTouch 100 units/mL subcutaneous solution: See Instructions, Instructions: 36 units sc QHS, # 5 syringes, 3 Refill(s), Type: Maintenance, Pharmacy: [facility]19, 36 units sc QHS  Loratadine-D 12 Hour oral tablet, extended release: 1 tab(s), po, q12 hrs, # 180 tab(s), 1 Refill(s), Type: Maintenance, Pharmacy: [facility]19, 1 tab(s) po q12 hrs  Relion Pen Needles [id number] MIS: Relion Pen Needles [id number] MIS, See Instructions, Instructions: for DM E11.9 use with insulin injection BID, Supply, # 120 EA, 1 Refill(s), Type: Maintenance, for DM E11.9 use with insulin injection BID  allopurinol 100 mg oral tablet: 1 tab(s) ( 100 mg ), PO, daily, # 90 tab(s), 1 Refill(s), Type: Maintenance, Pharmacy: [facility]19, 1 tab(s) po daily  atenolol-chlorthalidone 50 mg-25 mg oral tablet: 1 tab(s), po, daily, # 90 tab(s), 1 Refill(s), Type: Maintenance, Pharmacy: [facility] 1819, 1 tab(s) po daily  betamethasone valerate 0.1% topical cream: 1 adriel, TOP, BID, # 45 g, 1 Refill(s), Type: Maintenance, Pharmacy: [facility], 1 adirel top bid  furosemide 20 mg oral tablet: 1 tab(s) ( 20 mg ), po, daily, # 90 tab(s), 1 Refill(s), Type: Maintenance, Pharmacy: [facility], 1 tab(s) po daily  lisinopril 10  mg oral tablet: 1 tab(s) ( 10 mg ), PO, Daily, # 90 tab(s), 1 Refill(s), Type: Maintenance, Pharmacy: [facility], 1 tab(s) po daily  oxybutynin 5 mg oral tablet: 1 tab(s) ( 5 mg ), po, bid, # 180 tab(s), 1 Refill(s), Type: Maintenance, Pharmacy: [facility], 1 tab(s) po bid  simvastatin 40 mg oral tablet: 1 tab(s) ( 40 mg ), po, hs, # 90 tab(s), 1 Refill(s), Type: Maintenance, Pharmacy: [facility], 1 tab(s) po hs   Problem list:    All Problems  Allergic Rhinitis / ICD-9- / Confirmed  Asthma / ICD-9-.90 / Confirmed  Basal cell carcinoma / ICD-9-.91 / Confirmed  Chronic Renal Disease, Stage III / ICD-9-.3 / Confirmed  Current use of insulin / SNOMED CT 7130644505 / Confirmed  Gout / SNOMED CT 012578073 / Confirmed  HLD (Hyperlipidemia) / SNOMED CT 108050122 / Confirmed  HTN (hypertension) / SNOMED CT 3770504821 / Confirmed  Impaired circulation of left lower extremity / SNOMED CT 06054106 / Confirmed  Incontinence / SNOMED CT 45028961 / Confirmed  Menopause / ICD-9-.2 / Confirmed  OA (Osteoarthritis) / ICD-9-.90 / Confirmed  Obesity / ICD-9-.00 / Confirmed  Overactive bladder / ICD-9-.51 / Confirmed  Type 2 diabetes mellitus / SNOMED CT 241584448 / Confirmed  Inactive: Tobacco abuse / ICD-9-.1  Resolved: *Hospitalized @ [facility] - Chest pain  Canceled: Hyperlipemia / ICD-9-.4      Histories   Past Medical History:    Active  Obesity (278.00)  Type 2 diabetes mellitus (223052835)  HTN (hypertension) (3315742580)  OA (Osteoarthritis) (715.90)  Comments:  2/1/2010 CST 2:32 PM CST - Milena Lowry LPN  right knee  Asthma (493.90)  Chronic Renal Disease, Stage III (585.3)  Incontinence (92514414)  Comments:  2/1/2010 CST 2:33 PM CST - Shackleton LPN, Milena  urge  Overactive bladder (596.51)  Menopause (627.2)  Allergic Rhinitis (477)  Resolved  *Hospitalized @ [facility] - Chest pain:  Resolved.   Family History:     Cancer  Grandmother (M)  Emphysema of lung  Father     Procedure history:    Anal sphincterotomy (SNOMED CT 313173552) on 2005 at 60 Years.  Comments:  2013 1:31 PM - Zohra Terrazas MD  Carpal tunnel release (SNOMED CT 616196492) in  at 43 Years.  Comments:  2010 10:57 AM - Milena Lowry LPN  bilateral   section (SNOMED CT 95669502) in  at 34 Years.  Appendectomy (SNOMED CT 202744053) in 1952 at 7 Years.  Tonsillectomy (SNOMED CT 004124340) in 1947 at 13 Months.   Social History:        Alcohol Assessment: Denies Alcohol Use            Never      Tobacco Assessment: Past            Past      Substance Abuse Assessment            Never      Employment and Education Assessment            Retired      Home and Environment Assessment            Marital status: .  Lives with Self.  1 children.  Living situation: Home/Independent.      Exercise and Physical Activity Assessment: Does not exercise            Exercise frequency: No regular exercise..        Physical Examination   Vital signs reviewed  and within acceptable limits    Vital Signs   3/26/2018 1:08 PM CDT Peripheral Pulse Rate 74 bpm    Systolic Blood Pressure 128 mmHg    Diastolic Blood Pressure 72 mmHg    Mean Arterial Pressure 91 mmHg    BP Site Right arm    Oxygen Saturation 98 %      Measurements from flowsheet : Measurements   3/26/2018 1:08 PM CDT Height Measured - Standard 62 in    Weight Measured - Standard 267.4 lb    BSA 2.3 m2    Body Mass Index 48.9 kg/m2  HI      General:  Alert and oriented, No acute distress.    Eye:  Pupils are equal, round and reactive to light, Extraocular movements are intact, Normal conjunctiva.    HENT:  Normocephalic, Tympanic membranes are clear, Normal hearing, Oral mucosa is moist, No pharyngeal erythema.    Neck:  Supple, Non-tender, No carotid bruit, No jugular venous distention, No lymphadenopathy, No thyromegaly.    Respiratory:  Lungs are clear to  auscultation, Respirations are non-labored, Breath sounds are equal, Symmetrical chest wall expansion, No chest wall tenderness.    Cardiovascular:  Normal rate, Regular rhythm, No murmur, No gallop, Good pulses equal in all extremities, Normal peripheral perfusion, No edema.    Gastrointestinal:  Soft, Non-tender, Non-distended, Normal bowel sounds, No organomegaly.    Lymphatics:  No lymphadenopathy neck, axilla, groin.    Musculoskeletal:  Normal range of motion, Normal strength, No tenderness, No swelling, No deformity, Normal gait.    Integumentary:  Warm, Dry, Pink.    Neurologic:  Normal sensory, Normal motor function, No focal deficits, Cranial Nerves II-XII are grossly intact, Normal deep tendon reflexes.    Psychiatric:  Cooperative, Appropriate mood & affect, Normal judgment.       Review / Management   Results review:  Lab results   3/14/2018 12:45 PM CDT Sodium Level 143 mmol/L    Potassium Level 4.4 mmol/L    Chloride Level 106 mmol/L    CO2 Level 28 mmol/L    Glucose Level 110 mg/dL  HI    BUN 28 mg/dL  HI    Creatinine 1.47 mg/dL  HI    BUN/Creat Ratio 19    eGFR 36 mL/min/1.73m2  LOW    eGFR African American 41 mL/min/1.73m2  LOW    Calcium Level 9.2 mg/dL    Bili Total 0.5 mg/dL    Alk Phos 90 unit/L    AST/SGOT 25 unit/L    ALT/SGPT 26 unit/L    Protein Total 6.5 gm/dL    Albumin Level 3.8 gm/dL    Globulin 2.7    A/G Ratio 1.4    Hgb A1c 7.2  HI    Cholesterol 163 mg/dL    Non-    HDL 57 mg/dL    Chol/HDL Ratio 2.9    LDL 86    Triglyceride 107 mg/dL    U Microalbumin 28.9 mg/dL    Microalbumin Comment See comment    WBC 10.8    RBC 4.21    Hgb 13.4 gm/dL    Hct 40.4 %    MCV 96.0 fL    MCH 31.8 pg    MCHC 33.2 gm/dL    RDW 11.8 %    Platelet 290    MPV 11.6 fL   .

## 2022-02-15 NOTE — TELEPHONE ENCOUNTER
Entered by Bryn Harman MD on July 19, 2021 7:49:28 PM CDT  ---------------------  From: Bryn Harman MD   To: [facility]    Sent: 7/19/2021 7:49:28 PM CDT  Subject: Medication Management     ** Submitted: **  Complete:furosemide (furosemide 20 mg oral tablet)   Signed by Bryn Harman MD  7/20/2021 12:49:00 AM [facility]    ** Approved with modifications: **  furosemide (furosemide 20 mg tablet)  TAKE TWO TABLETS BY MOUTH DAILY  Qty:  180 tab(s)        Days Supply:  90        Refills:  0          Substitutions Allowed     Route To Pharmacy - [facility]   Note to Pharmacy:  Patient needs appointment  Signed by Bryn Harman MD            ------------------------------------------  From: [facility] [place]  To: Thony Salazar MD  Sent: July 19, 2021 8:18:10 AM CDT  Subject: Medication Management  Due: July 17, 2021 12:55:18 PM CDT     ** On Hold Pending Signature **     Drug: furosemide (furosemide 20 mg oral tablet), 2 tab(s) Oral daily  Quantity: 180 tab(s)  Days Supply: 0  Refills: 0  Substitutions Allowed  Notes from Pharmacy:     Dispensed Drug: furosemide (furosemide 20 mg oral tablet), TAKE TWO TABLETS BY MOUTH DAILY  Quantity: 180 tab(s)  Days Supply: 90  Refills: 1  Substitutions Allowed  Notes from Pharmacy:  ------------------------------------------

## 2022-02-15 NOTE — LETTER
(Inserted Image. Unable to display)   [address] [facility] [address]  August 19, 2019      BOO ESCOBAR  [address] [place] [id number]        Dear BOO,     Thank you for selecting [facility] for your healthcare needs. Below you will find the results of the recent tests done at our clinic.      The A1c was 8.6 which is about the same as last April.  It is still above the goal of <8.0.  I recommend increasing the dose of your Humalog insulin to 7 or 8 units with each meal.  Repeat the A1c again in two months.      Result Name Current Result Previous Result Reference Range   Hgb A1c ((H)) 8.6 8/15/2019 ((H)) 8.7 4/24/2019  - <5.7   WBC ((H)) 11.5 8/15/2019  10.8 4/24/2019 3.8 - 10.8   RBC  4.17 8/15/2019  4.15 4/24/2019 3.80 - 5.10   Hgb (gm/dL)  13.1 8/15/2019  13.2 4/24/2019 11.7 - 15.5   Hct (%)  40.5 8/15/2019  39.5 4/24/2019 35.0 - 45.0   MCV (fL)  97.1 8/15/2019  95.2 4/24/2019 80.0 - 100.0   MCH (pg)  31.4 8/15/2019  31.8 4/24/2019 27.0 - 33.0   MCHC (gm/dL)  32.3 8/15/2019  33.4 4/24/2019 32.0 - 36.0   RDW (%)  11.8 8/15/2019  12.1 4/24/2019 11.0 - 15.0   Platelet  308 8/15/2019  312 4/24/2019 140 - 400   MPV (fL)  11.3 8/15/2019  11.5 4/24/2019 7.5 - 12.5       Please contact my practice at [phone]  if you have any questions or concerns.     Sincerely,        Thony Salazar MD          What do your labs mean?  Below is a glossary of commonly ordered labs:  LDL   Bad Cholesterol   HDL   Good Cholesterol  AST/ALT   Liver Function   Cr/Creatinine   Kidney Function  Microalbumin   Kidney Function  BUN   Kidney Function  PSA   Prostate    TSH   Thyroid Hormone  HgbA1c   Diabetes Test   Hgb (Hemoglobin)   Red Blood Cells

## 2022-02-15 NOTE — LETTER
(Inserted Image. Unable to display)   [address] [facility] [address]  April 25, 2019      BOO ESCOBAR  [facility] [address] [place] [id number]        Dear BOO,     Thank you for selecting [facility] for your healthcare needs. Below you will find the results of the recent tests done at our clinic.      The creatinine (measure of kidney function) has become more elevated.  I recommend you discontinue the Lisinopril and recheck the blood test in one month.  The A1c (measure of diabetes control) is above the goal of eight or less.  I recommend you increase the Levemir to 40 units daily and Increase the short acting insulin to 5 units three times a day.  Recheck the blood test in one month.      Result Name Current Result Previous Result Reference Range   Sodium Level (mmol/L)  141 4/24/2019  143 3/14/2018 135 - 146   Potassium Level (mmol/L)  4.8 4/24/2019  4.4 3/14/2018 3.5 - 5.3   Chloride Level (mmol/L)  103 4/24/2019  106 3/14/2018 98 - 110   CO2 Level (mmol/L)  28 4/24/2019  28 3/14/2018 20 - 32   Glucose Level (mg/dL) ((H)) 140 4/24/2019 ((H)) 110 3/14/2018 65 - 99   BUN (mg/dL) ((H)) 38 4/24/2019 ((H)) 28 3/14/2018 7 - 25   Creatinine Level (mg/dL) ((H)) 1.82 4/24/2019 ((H)) 1.47 3/14/2018 0.60 - 0.93   BUN/Creat Ratio  21 4/24/2019  19 3/14/2018 6 - 22   eGFR (mL/min/1.73m2) ((L)) 27 4/24/2019 ((L)) 36 3/14/2018 > OR = 60 -    eGFR  (mL/min/1.73m2) ((L)) 32 4/24/2019 ((L)) 41 3/14/2018 > OR = 60 -    Calcium Level (mg/dL)  9.4 4/24/2019  9.2 3/14/2018 8.6 - 10.4   Bilirubin Total (mg/dL)  0.5 4/24/2019  0.5 3/14/2018 0.2 - 1.2   Alkaline Phosphatase (unit/L)  99 4/24/2019  90 3/14/2018 33 - 130   AST/SGOT (unit/L)  22 4/24/2019  25 3/14/2018 10 - 35   ALT/SGPT (unit/L)  21 4/24/2019  26 3/14/2018 6 - 29   Protein Total (gm/dL)  6.6 4/24/2019  6.5 3/14/2018 6.1 - 8.1   Albumin Level (gm/dL)  3.8 4/24/2019  3.8 3/14/2018 3.6 - 5.1   Globulin  2.8  4/24/2019  2.7 3/14/2018 1.9 - 3.7   A/G Ratio  1.4 4/24/2019  1.4 3/14/2018 1.0 - 2.5   Hgb A1c ((H)) 8.7 4/24/2019 ((H)) 7.2 3/14/2018  - <5.7   Cholesterol (mg/dL)  167 4/24/2019  163 3/14/2018  - <200   Non-HDL Cholesterol  109 4/24/2019  106 3/14/2018  - <130   HDL (mg/dL)  58 4/24/2019  57 3/14/2018 >50 -    Cholesterol/HDL Ratio  2.9 4/24/2019  2.9 3/14/2018  - <5.0   LDL  89 4/24/2019  86 3/14/2018    Triglyceride (mg/dL)  107 4/24/2019  107 3/14/2018  - <150   U Microalbumin (mg/dL)  4.0 4/24/2019  28.9 3/14/2018 See Note: -    Ur Creatinine (mg/dL)  23 4/24/2019  20 - 275   Ur Microalbumin/Creatinine Ratio ((H)) 174 4/24/2019   - <30   WBC  10.8 4/24/2019  10.8 3/14/2018 3.8 - 10.8   RBC  4.15 4/24/2019  4.21 3/14/2018 3.80 - 5.10   Hgb (gm/dL)  13.2 4/24/2019  13.4 3/14/2018 11.7 - 15.5   Hct (%)  39.5 4/24/2019  40.4 3/14/2018 35.0 - 45.0   MCV (fL)  95.2 4/24/2019  96.0 3/14/2018 80.0 - 100.0   MCH (pg)  31.8 4/24/2019  31.8 3/14/2018 27.0 - 33.0   MCHC (gm/dL)  33.4 4/24/2019  33.2 3/14/2018 32.0 - 36.0   RDW (%)  12.1 4/24/2019  11.8 3/14/2018 11.0 - 15.0   Platelet  312 4/24/2019  290 3/14/2018 140 - 400   MPV (fL)  11.5 4/24/2019  11.6 3/14/2018 7.5 - 12.5       Please contact my practice at [phone]  if you have any questions or concerns.     Sincerely,        Thony Salazar MD          What do your labs mean?  Below is a glossary of commonly ordered labs:  LDL   Bad Cholesterol   HDL   Good Cholesterol  AST/ALT   Liver Function   Cr/Creatinine   Kidney Function  Microalbumin   Kidney Function  BUN   Kidney Function  PSA   Prostate    TSH   Thyroid Hormone  HgbA1c   Diabetes Test   Hgb (Hemoglobin)   Red Blood Cells

## 2022-02-15 NOTE — TELEPHONE ENCOUNTER
Entered by Cyndee Sampson CMA on March 15, 2021 10:27:28 AM CDT  ---------------------  From: Cyndee Sampson CMA   To: [facility]    Sent: 3/15/2021 10:27:27 AM CDT  Subject: Medication Management     ** Not Approved: Refill not appropriate **  atenolol-chlorthalidone (atenolol 50 mg-chlorthalidone 25 mg tablet)  TAKE ONE TABLET BY MOUTH DAILY  Qty:  90 tab(s)        Days Supply:  90        Refills:  1          Substitutions Allowed     Route To Pharmacy - [facility]   Note from Pharmacy:  This prescription was filled on 3/15/2021. Any refills authorized will be placed on file.  Signed by Cyndee Sampson CMA            ------------------------------------------  From: [facility]  To: Thony Salazar MD  Sent: March 15, 2021 8:47:06 AM CDT  Subject: Medication Management  Due: March 12, 2021 3:04:47 PM CST     ** On Hold Pending Signature **     Drug: atenolol-chlorthalidone (atenolol-chlorthalidone 50 mg-25 mg oral tablet), 1 tab(s) Oral daily  Quantity: 90 tab(s)  Days Supply: 0  Refills: 0  Substitutions Allowed  Notes from Pharmacy: pt due for appt in Dec     Dispensed Drug: atenolol-chlorthalidone (atenolol-chlorthalidone 50 mg-25 mg oral tablet), TAKE ONE TABLET BY MOUTH DAILY  Quantity: 90 tab(s)  Days Supply: 90  Refills: 1  Substitutions Allowed  Notes from Pharmacy: This prescription was filled on 3/15/2021. Any refills authorized will be placed on file.  ------------------------------------------

## 2022-02-15 NOTE — TELEPHONE ENCOUNTER
---------------------  From: Eileen Ornelas   To: [facility] ([id number])   ;     Sent: 1/14/2021 7:32:55 AM CST  Subject: Refill Request: Simvastatin 40mg      Received fax refill request from [facility] for Simvastatin 40mg. Med was discontinued off of medication list.     Med Refill      Date of last office visit and reason:  11/25/20 for f/up with [facility]      Date of last Med Check / Px:   _  Date of last labs pertaining to med:  11/25/20    RTC order in chart:  Yes, RTC for 6 month f/up in May 2021    For Protocol refill, has patient been contacted:  Not filled as med was discontinued from med list, likely in error as there is no mention of discontinuing med in recent note.  Will forward to MD for auth.---------------------  From: Grace Dumont CMA ([facility] ([id number])   )   To: Thony Salazar MD;     Sent: 1/14/2021 10:24:50 AM CST  Subject: FW: Refill Request: Simvastatin 40mg---------------------  From: Thony Salazar MD   To: [facility] ([id number])   ;     Sent: 1/14/2021 10:49:46 AM CST  Subject: RE: Refill Request: Simvastatin 40mg      Simvastatin was intentionally discontinued.faxed pharmacy with d/c instructions

## 2022-02-15 NOTE — LETTER
(Inserted Image. Unable to display)   April 03, 2019        BOO ESCOBAR  [address] [place] [phone]        Dear BOO,      Thank you for selecting [facility] for your [facility] needs.    Our records indicate you are due for the following services:    Urine labs ~ Please be prepared to leave a urine specimen for evaluation  Fasting Lab Tests ~ Please do not eat or drink anything 10 hours prior to your scheduled appointment time.  (Water and any medications that you may need are allowed unless directed otherwise.)    If you had your labs done at another facility or with Direct Access Lab Testing at [facility], please bring in a copy of the results to your next visit, mail a copy, or drop off a copy of your results to your Healthcare Provider.  Diabetic Exam ~ Please bring your glucose meter and/or your blood glucose diary to your appointment.  Hypertension check ~ please remember to bring your at-home blood pressure readings with you to your appointment.     You are due for lab work and an office visit, please schedule the lab appointment 1 week before the office visit.  This will assure all results are available to discuss with your provider during your visit.    **It is very helpful if you bring your medication bottles to your appointment.  This assures we have all of your current medications, including strength and dosing information, documented accurately in your medical record.    To schedule an appointment or if you have further questions, please contact your primary clinic:   [facility]       [phone]   [facility]       [phone]              [facility]     [phone]      Powered by [employer]    Sincerely,    Thony Salazar M.D.

## 2022-02-15 NOTE — TELEPHONE ENCOUNTER
Entered by Kelly Patrick CMA on Emily 15, 2021 12:00:32 PM CDT  LM at 1158 for pt to c/b and schedule appt to establish care with new provider (previous JOE). Last appt 11/25/20 metabolic syndrome with JOE. Non-fasting labs due as well.       ------------------------------------------  From: [facility]  [place]  To: Thony Salazar MD  Sent: June 14, 2021 9:22:16 AM CDT  Subject: Medication Management  Due: June 4, 2021 8:25:53 PM CDT     ** On Hold Pending Signature **     Dispensed Drug: insulin lispro (HumaLOG KwikPen 100 units/mL injectable solution), TAKE TEN UNITS SUBCUTANEOUSLY (UNDER THE SKIN) THREE TIMES DAILY BEFORE MEALS  Quantity: 15 mL  Days Supply: 42  Refills: 0  Substitutions Allowed  Notes from Pharmacy:  ------------------------------------------

## 2022-02-15 NOTE — LETTER
(Inserted Image. Unable to display)   [address] [place] [id number]  December 07, 2020      BOO ESCOBAR  [facility] [address] [place] [address] [address] [place] [phone]        Dear BOO,      Thank you for selecting [facility] for your healthcare needs.      This letter is to inform you that we have received a copy of your mammogram results.  Your results were normal unless noted below.  You will also receive notice of your results from the radiologist within 7-10 days.  This letter is to let you know I have also received a copy and it is filed in your clinic chart.      Please plan on having your next mammogram done in 12 months.          Please contact my practice at [phone] if you have any questions or concerns.     Sincerely,        Thony Salazar MD

## 2022-02-15 NOTE — PROGRESS NOTES
Patient:   BOO ESCOBAR            MRN: [id number]            FIN: [id number]               Age:   71 years     Sex:  Female     :  1945   Associated Diagnoses:   Cellulitis of finger; Eczema of hand   Author:   Thony Salazar MD      Impression and Plan   Diagnosis     Cellulitis of finger (VMI62-GJ L03.012).     Eczema of hand (GZN34-EP L20.89).     Course:  Worsening.    Orders     Orders   Charges (Evaluation and Management):  814[address] office outpatient visit 15 minutes (Charge) (Order): Quantity: 1, Eczema of hand  Cellulitis of finger.     Orders (Selected)   Prescriptions  Prescribed  betamethasone valerate 0.1% topical cream: 1 adrile, TOP, BID, # 45 g, 0 Refill(s), Type: Maintenance, Pharmacy: [facility] , 1 adriel top bid  cephalexin 500 mg oral capsule: 1 cap(s) ( 500 mg ), PO, tid, # 30 cap(s), 0 Refill(s), Type: Maintenance, Pharmacy: [facility] , 1 cap(s) po tid,x10 day(s).        Visit Information   Visit type:  New symptom.    Accompanied by:  No one.    Source of history:  Self.    History limitation:  None.       Chief Complaint   3/16/2017 1:13 PM CDT    Pt here for cough and infected fingers        History of Present Illness             The patient presents with Has dermatitis of both hands/fingers.  The skin breakdown has resulted in cellulitis of the left third finger..  Exacerbating factors consist of dry winter air.  Relieving factors consist of lotion.        Review of Systems   Constitutional:  Negative.    Eye:  Negative.    Ear/Nose/Mouth/Throat:  Negative.    Respiratory:  Cough.    Cardiovascular:  Negative.    Gastrointestinal:  Negative.    Genitourinary:  Negative.    Hematology/Lymphatics:  Negative.    Endocrine:  Negative.    Immunologic:  Negative.    Musculoskeletal:  Negative.    Integumentary:  Negative except as documented in history of present illness.    Neurologic:  Negative.    Psychiatric:  Negative.    All other systems reviewed and negative       Health Status   Allergies:    Allergic Reactions (Selected)  Moderate  Neosporin (Skin rash)   Medications:  (Selected)   Prescriptions  Prescribed  Any brand Microfine 6 mm 31 g  Pen needles: Any brand Microfine 6 mm 31 g  Pen needles, See Instructions, Instructions: use 1 needle daily, Supply, # 1 box(es), 5 Refill(s), Type: Maintenance, Pharmacy: [facility], this is to replace rx for [facility] needles, use 1 needle daily  Aspir 81 oral enteric coated tablet: 1 tab(s) ( 81 mg ), PO, Daily, # 100 tab(s), 0 Refill(s), Type: Maintenance, OTC (Rx)  Ronak Contour next test strips: Ronak Contour next test strips, See Instructions, Instructions: Testing BID, Supply, # 200 EA, 1 Refill(s), Type: Maintenance, Pharmacy: [facility], Testing BID  Humalog Pen 100 units/mL subcutaneous solution: ( 8 unit(s) ), Subcutaneous, TIDAC, # 15 mL, 5 Refill(s), Type: Maintenance, other reason (Rx)  Levemir FlexTouch 100 units/mL subcutaneous solution: See Instructions, Instructions: 36 units sc QHS, # 45 mL, 5 Refill(s), Type: Maintenance, Pharmacy: [facility], 36 units sc QHS  Loratadine-D 12 Hour oral tablet, extended release: 1 tab(s), po, q12 hrs, # 180 tab(s), 1 Refill(s), Type: Maintenance, Pharmacy: [facility], 1 tab(s) po q12 hrs  allopurinol 100 mg oral tablet: 1 tab(s) ( 100 mg ), PO, daily, # 90 tab(s), 1 Refill(s), Type: Maintenance, Pharmacy: [facility], 1 tab(s) po daily  atenolol-chlorthalidone 50 mg-25 mg oral tablet: 1 tab(s), po, daily, # 90 tab(s), 1 Refill(s), Type: Maintenance, Pharmacy: [facility], 1 tab(s) po daily  betamethasone valerate 0.1% topical cream: 1 adriel, TOP, BID, # 45 g, 0 Refill(s), Type: Maintenance, Pharmacy: [facility], 1 adriel top bid  cephalexin 500 mg oral capsule: 1 cap(s) ( 500 mg ), PO, tid, # 30 cap(s), 0 Refill(s), Type: Maintenance, Pharmacy: [facility], 1 cap(s) po tid,x10  day(s)  cholecalciferol 2000 intl units oral tablet: 1 tab(s) ( 2,000 International Unit ), po, daily, # 30 tab(s), 5 Refill(s), Type: Maintenance, Pharmacy: [facility], 1 tab(s) po daily  furosemide 20 mg oral tablet: 1 tab(s) ( 20 mg ), po, daily, # 90 tab(s), 1 Refill(s), Type: Maintenance, Pharmacy: [facility], 1 tab(s) po daily  lisinopril 10 mg oral tablet: 1 tab(s) ( 10 mg ), PO, Daily, # 90 tab(s), 1 Refill(s), Type: Maintenance, Pharmacy: [facility], 1 tab(s) po daily  oxybutynin 5 mg oral tablet: 1 tab(s) ( 5 mg ), po, bid, # 180 tab(s), 1 Refill(s), Type: Maintenance, Pharmacy: [facility], 1 tab(s) po bid  simvastatin 40 mg oral tablet: 1 tab(s) ( 40 mg ), po, hs, # 90 tab(s), 1 Refill(s), Type: Maintenance, Pharmacy: [facility], 1 tab(s) po hs   Problem list:    All Problems (Selected)  Allergic Rhinitis / ICD-9- / Confirmed  Asthma / ICD-9-.90 / Confirmed  Basal cell carcinoma / ICD-9-.91 / Confirmed  Chronic Renal Disease, Stage III / ICD-9-.3 / Confirmed  Gout / SNOMED CT 916731716 / Confirmed  HLD (Hyperlipidemia) / SNOMED CT 234370802 / Confirmed  HTN (hypertension) / SNOMED CT 8989786630 / Confirmed  Incontinence / SNOMED CT 33556061 / Confirmed  Menopause / ICD-9-.2 / Confirmed  OA (Osteoarthritis) / ICD-9-.90 / Confirmed  Obesity / ICD-9-.00 / Confirmed  Overactive bladder / ICD-9-.51 / Confirmed  Type 2 diabetes mellitus / SNOMED CT 577009578 / Confirmed      Histories   Past Medical History:    Active  Obesity (278.00)  Type 2 diabetes mellitus (197761014)  HTN (hypertension) (4421460764)  OA (Osteoarthritis) (715.90)  Comments:  2/1/2010 CST 2:32 PM CST - Milena Lowry LPN  right knee  Asthma (493.90)  Chronic Renal Disease, Stage III (585.3)  Incontinence (07344108)  Comments:  2/1/2010 CST 2:33 PM CST - Shackleton LPN, Milena  urge  Overactive bladder (596.51)  Menopause  (627.2)  Allergic Rhinitis (477)  Resolved  *Hospitalized @ [facility] - Chest pain:  Resolved.   Family History:    Cancer  Grandmother (M)  Emphysema of lung  Father     Procedure history:    Anal sphincterotomy (SNOMED CT 945669566) on 2005 at 60 Years.  Comments:  2013 1:31 PM - Zohra Terrazas MD  Carpal tunnel release (SNOMED CT 178576812) in  at 43 Years.  Comments:  2010 10:57 AM - Milena Lowry LPN  bilateral   section (SNOMED CT 60022481) in  at 34 Years.  Appendectomy (SNOMED CT 638973908) in  at 7 Years.  Tonsillectomy (SNOMED CT 125324053) in 1946 at 13 Months.   Social History:        Alcohol Assessment: Denies Alcohol Use            Never      Tobacco Assessment: Past            Past      Substance Abuse Assessment            Never      Employment and Education Assessment            Retired      Home and Environment Assessment            Marital status: .  Lives with Self.  1 children.  Living situation: Home/Independent.      Exercise and Physical Activity Assessment: Does not exercise            Exercise frequency: No regular exercise..        Physical Examination   Vital Signs   3/16/2017 1:13 PM CDT Temperature Tympanic 97.7 DegF  LOW    Peripheral Pulse Rate 78 bpm    Systolic Blood Pressure 128 mmHg    Diastolic Blood Pressure 74 mmHg    Mean Arterial Pressure 92 mmHg    Oxygen Saturation 94 %      Measurements from flowsheet : Measurements   3/16/2017 1:13 PM CDT    Weight Measured - Standard                257.2 lb     General:  Alert and oriented X 3, No acute distress, Warm, Pink, Intact.         Appearance: Within normal limits, Well nourished, Calm.         Hydration: Within normal limits.         Psych: Within normal limits, Appropriate mood and affect, Cooperative, Normal judgment.    Integumentary:  dry, red irritated skin on the hands and finger bilateral.  The left third finger has marked erythema, swelling and  tenderness.  No discharge..

## 2022-02-15 NOTE — PROGRESS NOTES
"   Patient:   BOO ESCOBAR            MRN: [id number]            FIN: [id number]               Age:   74 years     Sex:  Female     :  1945   Associated Diagnoses:   Left wrist pain   Author:   Thony Salazar MD      Impression and Plan   Diagnosis     Left wrist pain ([id number] M25.532).     Plan:  Continue splint for  2 week(s).    Orders     Orders   Charges (Evaluation and Management):  35747 office outpatient visit 25 minutes (Charge) (Order): Quantity: 1, Left wrist pain.     Orders (Selected)   Outpatient Orders  Completed  XR Wrist Min 3 Views Left (Request): Left wrist pain  Order  [id number] application short arm splint forearm-hand static (Charge): Quantity: 1, Left wrist pain.        Visit Information   Visit type:  New symptom.    Accompanied by:  No one.    Source of history:  Self.    History limitation:  None.       Chief Complaint   2020 3:21 PM CDT     Pt here for Left wrist pain x 5 days. Pt states she was carrying in a \"basket full of graoceries\" and \"felt something pop in wirst\" Pt also c/o painful lump on stomach x 3-4 months.        History of Present Illness             The patient presents with wrist injury.  The location of wrist injury is the left and wrist(s).  The wrist injury is characterized by pain.  The severity of the wrist injury is moderate.  The timing/course of symptom(s) associated to the wrist injury is constant.  The wrist injury occurred 5 day(s).  The context of the wrist injury: occurred after lifting heavy basket.  Prior treatment consists of none.        Review of Systems   Constitutional:  Negative.    Eye:  Negative.    Ear/Nose/Mouth/Throat:  Negative.    Respiratory:  Negative.    Cardiovascular:  Negative.    Gastrointestinal:  Negative.    Genitourinary:  Negative.    Hematology/Lymphatics:  Negative.    Endocrine:  Negative.    Immunologic:  Negative.    Musculoskeletal:  Negative except as documented in history of present illness.    Integumentary:  Negative.  "   Neurologic:  Negative.    Psychiatric:  Negative.    All other systems reviewed and negative      Health Status   Allergies:    Allergic Reactions (Selected)  Moderate  Neosporin (Skin rash)   Medications:  (Selected)   Prescriptions  Prescribed  Alavert D-12 oral tablet, extended release: 1 tab(s), Oral, q12 hrs, # 180 tab(s), 1 Refill(s), Type: Maintenance, Pharmacy: [facility] [place], [place], 1 tab(s) Oral q12 hrs  Aspir 81 oral delayed release tablet: 1 tab(s) ( 81 mg ), PO, Daily, # 100 tab(s), 0 Refill(s), Type: Maintenance, Pharmacy: [facility], [place], 1 tab(s) po daily  Ronak Contour next test strips: Ronak Contour next test strips, See Instructions, Instructions: Testing BID, Supply, # 200 EA, 1 Refill(s), Type: Maintenance, Pharmacy: [id number], Testing BID  CONTOUR NEXT TEST Strips: CONTOUR NEXT TEST Strips, See Instructions, Instructions: USE ONE STRIP TO CHECK GLUCOSE BID  DX: E11.9, Supply, # 120 EA, 5 Refill(s), Type: Soft Stop, Pharmacy: [facility], [place], USE ONE STRIP TO CHECK GLUCOSE BID; DX: E11.9  HumaLOG KwikPen 100 units/mL injectable solution: ( 10 unit(s) ), subcutaneous, tidac, # 2 syringes, 5 Refill(s), Type: Maintenance, Pharmacy: [facility], [place], Dispense 90 day supply, 10 unit(s) Subcutaneous tidac  Levemir FlexTouch 100 units/mL subcutaneous solution: See Instructions, Instructions: 40 units sc QHS, # 5 syringes, 3 Refill(s), Type: Maintenance, Pharmacy: [facility], [place], 40 units sc QHS  Relion Pen Needles [id number] MIS: Relion Pen Needles [id number] MIS, See Instructions, Instructions: for DM E11.9 use with insulin injection QID, Supply, # 120 EA, 5 Refill(s), Type: Maintenance, Pharmacy: [facility], for DM E11.9 use with insulin injection QID  allopurinol 100 mg oral tablet: = 1 tab(s) ( 100 mg ), PO, daily, # 90 tab(s), 1 Refill(s), Type: Maintenance, Pharmacy: [facility]  [place], [place], 1 tab(s) Oral daily  atenolol-chlorthalidone 50 mg-25 mg oral tablet: 1 tab(s), po, daily, # 90 tab(s), 1 Refill(s), Type: Maintenance, Pharm[facility] [place], 1 tab(s) Oral daily  betamethasone valerate 0.1% topical cream: 1 adriel, TOP, BID, # 45 g, 1 Refill(s), Type: Maintenance, Pharmacy: [facility], [place], 1 adriel Topical bid  furosemide 20 mg oral tablet: = 2 tab(s) ( 40 mg ), po, daily, # 180 tab(s), 1 Refill(s), Type: Maintenance, Pharmacy: [facility], 2 tab(s) Oral daily  oxybutynin 5 mg oral tablet: = 1 tab(s) ( 5 mg ), po, bid, # 180 tab(s), 1 Refill(s), Type: Maintenance, Pharmacy: [facility] [place], 1 tab(s) Oral bid  simvastatin 40 mg oral tablet: = 1 tab(s) ( 40 mg ), po, hs, # 90 tab(s), 1 Refill(s), Type: Maintenance, Pharmacy: [facility] [place], 1 tab(s) Oral hs   Problem list:    All Problems (Selected)  Allergic Rhinitis / ICD-9- / Confirmed  Asthma / ICD-9-.90 / Confirmed  Basal cell carcinoma / ICD-9-.91 / Confirmed  Chronic Renal Disease, Stage III / ICD-9-.3 / Confirmed  Current use of insulin / SNOMED CT 2361922559 / Confirmed  Gout / SNOMED CT 613764564 / Confirmed  HLD (Hyperlipidemia) / SNOMED CT 356699632 / Confirmed  HTN (hypertension) / SNOMED CT 3076209622 / Confirmed  Impaired circulation of left lower extremity / SNOMED CT 53144861 / Confirmed  Incontinence / SNOMED CT 15353712 / Confirmed  Menopause / ICD-9-.2 / Confirmed  OA (Osteoarthritis) / ICD-9-.90 / Confirmed  Obesity / ICD-9-.00 / Confirmed  Overactive bladder / ICD-9-.51 / Confirmed  Type 2 diabetes mellitus / SNOMED CT 620972635 / Confirmed      Histories   Past Medical History:    Active  Obesity (278.00)  Type 2 diabetes mellitus (472730467)  HTN (hypertension) (0706280232)  OA (Osteoarthritis) (715.90)  Comments:  2/1/2010 CST 2:32 PM CST -  Milena Lowry LPN  right knee  Asthma (493.90)  Chronic Renal Disease, Stage III (585.3)  Incontinence (99371019)  Comments:  2010 CST 2:33 PM CST - Shackleton LPN, Milena  urge  Overactive bladder (596.51)  Menopause (627.2)  Allergic Rhinitis (477)  Resolved  *Hospitalized @ [facility] - Chest pain:  Resolved.   Family History:    Cancer  Grandmother (M)  Emphysema of lung  Father     Procedure history:    Anal sphincterotomy (SNOMED CT 450882445) on 2005 at 60 Years.  Comments:  2013 1:31 PM CST - Zohra Terrazas MD  Carpal tunnel release (SNOMED CT 548357708) in  at 43 Years.  Comments:  2010 10:57 AM CDT - Milena Lowry LPN  bilateral   section (SNOMED CT 13663044) in  at 34 Years.  Appendectomy (SNOMED CT 098568766) in 1952 at 7 Years.  Tonsillectomy (SNOMED CT 966009824) in 1947 at 13 Months.      Physical Examination   Vital Signs   2020 3:21 PM CDT Temperature Tympanic 97.4 DegF  LOW    Peripheral Pulse Rate 64 bpm    Pulse Site Radial artery    Respiratory Rate 16 br/min    Systolic Blood Pressure 126 mmHg    Diastolic Blood Pressure 78 mmHg    Mean Arterial Pressure 94 mmHg    BP Site Right arm      Measurements from flowsheet : Measurements   2020 3:21 PM CDT Height Measured - Standard 62 in    Weight Measured - Standard 264 lb    BSA 2.29 m2    Body Mass Index 48.28 kg/m2  HI      General:  Alert and oriented X 3, No acute distress, Warm, Pink, Intact.         Appearance: Within normal limits, Well nourished, Calm.         Hydration: Within normal limits.         Psych: Within normal limits, Appropriate mood and affect, Cooperative, Normal judgment.    Musculoskeletal:       Upper extremity exam: Wrist ( Left, Not displaced, No deformity, No erythema, No ecchymosis, No mass, No nodule, No swelling, Tenderness, No wound, No numbness, Strength  5 /5, Normal range of motion ).       Review / Management   Radiology results   X-ray, 3 views  left wrist , Reveals no acute disease process, Radiograph(s) result as interpreted by ordering provider reviewed with patient.  Radiologist will also interpret the radiograph(s) and patient will be informed if result is modified when both interpretations are compared.

## 2022-02-15 NOTE — NURSING NOTE
"Comprehensive Intake Entered On:  5/5/2020 3:31 PM CDT    Performed On:  5/5/2020 3:21 PM CDT by Gurdeep Flores CMA               Summary   Chief Complaint :   Pt here for Left wrist pain x 5 days. Pt states she was carrying in a \"basket full of graoceries\" and \"felt something pop in wirst\" Pt also c/o painful lump on stomach x 3-4 months.   Weight Measured :   264 lb(Converted to: 264 lb 0 oz, 119.75 kg)    Height Measured :   62 in(Converted to: 5 ft 2 in, 157.48 cm)    Body Mass Index :   48.28 kg/m2 (HI)    Body Surface Area :   2.29 m2   Systolic Blood Pressure :   126 mmHg   Diastolic Blood Pressure :   78 mmHg   Mean Arterial Pressure :   94 mmHg   Peripheral Pulse Rate :   64 bpm   BP Site :   Right arm   Pulse Site :   Radial artery   Temperature Tympanic :   97.4 DegF(Converted to: 36.3 DegC)  (LOW)    Respiratory Rate :   16 br/min   Gurdeep Flores CMA - 5/5/2020 3:21 PM CDT   Health Status   Allergies Verified? :   Yes   Medication History Verified? :   Yes   Medical History Verified? :   Yes   Pre-Visit Planning Status :   Not completed   Tobacco Use? :   Former smoker   Gurdeep Flores CMA - 5/5/2020 3:21 PM CDT   Meds / Allergies   (As Of: 5/5/2020 3:31:24 PM CDT)   Allergies (Active)   Neosporin  Estimated Onset Date:   <not entered> 2012 ; Reactions:   skin rash ; Created By:   Thony Salazar MD; Reaction Status:   Active ; Category:   Drug ; Substance:   Neosporin ; Type:   Allergy ; Severity:   Moderate ; Updated By:   Thony Salazar MD; Reviewed Date:   5/5/2020 3:27 PM CDT        Medication List   (As Of: 5/5/2020 3:31:24 PM CDT)   Prescription/Discharge Order    cyclobenzaprine  :   cyclobenzaprine ; Status:   Processing ; Ordered As Mnemonic:   cyclobenzaprine 10 mg oral tablet ; Ordering Provider:   Thony Slaazar MD; Action Display:   Complete ; Catalog Code:   cyclobenzaprine ; Order Dt/Tm:   5/5/2020 3:26:51 PM CDT          azithromycin  :   azithromycin ; Status:   Processing ; Ordered As " Mnemonic:   azithromycin 250 mg oral tablet ; Ordering Provider:   Thony Salazar MD; Action Display:   Complete ; Catalog Code:   azithromycin ; Order Dt/Tm:   5/5/2020 3:26:34 PM CDT          methylPREDNISolone  :   methylPREDNISolone ; Status:   Processing ; Ordered As Mnemonic:   Medrol Dosepak 4 mg oral tablet ; Ordering Provider:   Thony Salazar MD; Action Display:   Complete ; Catalog Code:   methylPREDNISolone ; Order Dt/Tm:   5/5/2020 3:26:43 PM CDT          Miscellaneous Rx Supply  :   Miscellaneous Rx Supply ; Status:   Prescribed ; Ordered As Mnemonic:   SmartCells Contour next test strips ; Simple Display Line:   See Instructions, Testing BID, 200 EA, 1 Refill(s) ; Ordering Provider:   Thony Salazar MD; Catalog Code:   Miscellaneous Rx Supply ; Order Dt/Tm:   9/12/2016 11:07:30 AM CDT          oxybutynin  :   oxybutynin ; Status:   Prescribed ; Ordered As Mnemonic:   oxybutynin 5 mg oral tablet ; Simple Display Line:   5 mg, 1 tab(s), po, bid, 180 tab(s), 1 Refill(s) ; Ordering Provider:   Thony Salazar MD; Catalog Code:   oxybutynin ; Order Dt/Tm:   11/21/2019 10:50:02 AM CST          insulin lispro  :   insulin lispro ; Status:   Prescribed ; Ordered As Mnemonic:   HumaLOG KwikPen 100 units/mL injectable solution ; Simple Display Line:   10 unit(s), subcutaneous, tidac, 2 syringes, 5 Refill(s) ; Ordering Provider:   Thony Salazar MD; Catalog Code:   insulin lispro ; Order Dt/Tm:   2/25/2020 9:59:59 AM CST          Miscellaneous Prescription  :   Miscellaneous Prescription ; Status:   Prescribed ; Ordered As Mnemonic:   Relion Pen Needles [id number] MIS ; Simple Display Line:   See Instructions, for DM E11.9 use with insulin injection QID, 120 EA, 5 Refill(s) ; Ordering Provider:   Thony Salazar MD; Catalog Code:   Miscellaneous Prescription ; Order Dt/Tm:   11/26/2019 11:20:13 AM CST          allopurinol  :   allopurinol ; Status:   Prescribed ; Ordered As Mnemonic:   allopurinol 100 mg oral tablet ;  Simple Display Line:   100 mg, 1 tab(s), PO, daily, 90 tab(s), 1 Refill(s) ; Ordering Provider:   Thony Salazar MD; Catalog Code:   allopurinol ; Order Dt/Tm:   11/21/2019 10:50:15 AM CST          simvastatin  :   simvastatin ; Status:   Prescribed ; Ordered As Mnemonic:   simvastatin 40 mg oral tablet ; Simple Display Line:   40 mg, 1 tab(s), po, hs, 90 tab(s), 1 Refill(s) ; Ordering Provider:   Thony Salazar MD; Catalog Code:   simvastatin ; Order Dt/Tm:   11/21/2019 10:50:09 AM CST          furosemide  :   furosemide ; Status:   Prescribed ; Ordered As Mnemonic:   furosemide 20 mg oral tablet ; Simple Display Line:   40 mg, 2 tab(s), po, daily, 180 tab(s), 1 Refill(s) ; Ordering Provider:   Thony Salazar MD; Catalog Code:   furosemide ; Order Dt/Tm:   11/21/2019 10:49:55 AM CST          betamethasone topical  :   betamethasone topical ; Status:   Prescribed ; Ordered As Mnemonic:   betamethasone valerate 0.1% topical cream ; Simple Display Line:   1 adriel, TOP, BID, 45 g, 1 Refill(s) ; Ordering Provider:   Thony Salazar MD; Catalog Code:   betamethasone topical ; Order Dt/Tm:   11/21/2019 10:49:50 AM CST          loratadine-pseudoephedrine  :   loratadine-pseudoephedrine ; Status:   Prescribed ; Ordered As Mnemonic:   Alavert D-12 oral tablet, extended release ; Simple Display Line:   1 tab(s), Oral, q12 hrs, 180 tab(s), 1 Refill(s) ; Ordering Provider:   Thony Salazar MD; Catalog Code:   loratadine-pseudoephedrine ; Order Dt/Tm:   11/21/2019 10:49:43 AM CST          atenolol-chlorthalidone  :   atenolol-chlorthalidone ; Status:   Prescribed ; Ordered As Mnemonic:   atenolol-chlorthalidone 50 mg-25 mg oral tablet ; Simple Display Line:   1 tab(s), po, daily, 90 tab(s), 1 Refill(s) ; Ordering Provider:   Thony Salazar MD; Catalog Code:   atenolol-chlorthalidone ; Order Dt/Tm:   11/21/2019 10:49:34 AM CST          insulin detemir  :   insulin detemir ; Status:   Prescribed ; Ordered As Mnemonic:   Levemir  FlexTouch 100 units/mL subcutaneous solution ; Simple Display Line:   See Instructions, 40 units sc QHS, 5 syringes, 3 Refill(s) ; Ordering Provider:   Thony Salazar MD; Catalog Code:   insulin detemir ; Order Dt/Tm:   11/21/2019 10:49:28 AM CST          Miscellaneous Rx Supply  :   Miscellaneous Rx Supply ; Status:   Prescribed ; Ordered As Mnemonic:   CONTOUR NEXT TEST Strips ; Simple Display Line:   See Instructions, USE ONE STRIP TO CHECK GLUCOSE BID  DX: E11.9, 120 EA, 5 Refill(s) ; Ordering Provider:   Thony Salazar MD; Catalog Code:   Miscellaneous Rx Supply ; Order Dt/Tm:   10/8/2019 7:29:46 AM CDT          aspirin  :   aspirin ; Status:   Prescribed ; Ordered As Mnemonic:   Aspir 81 oral delayed release tablet ; Simple Display Line:   81 mg, 1 tab(s), PO, Daily, 100 tab(s), 0 Refill(s) ; Ordering Provider:   Thony Salazar MD; Catalog Code:   aspirin ; Order Dt/Tm:   3/26/2018 1:30:54 PM CDT            ID Risk Screen   Recent Travel History :   No recent travel   Family Member Travel History :   Last travel within 21 days   Other Exposure to Infectious Disease :   Unknown   Gurdeep Flores CMA - 5/5/2020 3:21 PM CDT   Social History   Social History   (As Of: 5/5/2020 3:31:24 PM CDT)   Alcohol:  Denies Alcohol Use      Never   (Last Updated: 1/18/2013 1:31:28 PM CST by Zohra Terrazas)          Tobacco:  Past      Past   (Last Updated: 1/18/2013 1:36:20 PM CST by Zohra Terrazas)          Substance Abuse:        Never   (Last Updated: 9/16/2013 10:39:31 AM CDT by Thony Salazar MD)          Employment/School:        Retired   (Last Updated: 9/16/2013 10:39:42 AM CDT by Thony Salazar MD)          Home/Environment:        Marital status: .  Lives with Self.  1 children.  Living situation: Home/Independent.   (Last Updated: 9/16/2013 10:40:37 AM CDT by Thony Salazar MD)          Exercise:  Does not exercise      Exercise frequency: No regular exercise..   (Last Updated: 1/18/2013 1:31:53 PM CST by  Lucia Terrazas

## 2022-02-15 NOTE — NURSING NOTE
Pt had FIT testing done 8/20/14 and hasn't repeated since.  Pt overdue for screening.   RTC in place for chronic disease mgt in Sept 2018.  Will route to JOE.

## 2022-02-15 NOTE — PROGRESS NOTES
Patient:   BOO ESCOBAR            MRN: [id number]            FIN: [id number]               Age:   74 years     Sex:  Female     :  1945   Associated Diagnoses:   HTN (hypertension); HLD (Hyperlipidemia); Type 2 diabetes mellitus; Infection of lower respiratory tract   Author:   Thony Salazar MD      Impression and Plan   Diagnosis     HTN (hypertension) ([id number] I10).     Course:  Well controlled.    Orders     Orders   Charges (Evaluation and Management):  [id number] office outpatient visit 25 minutes (Charge) (Order): Quantity: 1, HLD (Hyperlipidemia)  Type 2 diabetes mellitus  HTN (hypertension).     Orders (Selected)   Prescriptions  Prescribed  atenolol-chlorthalidone 50 mg-25 mg oral tablet: 1 tab(s), po, daily, # 90 tab(s), 1 Refill(s), Type: Maintenance, Pharmacy: [facility] [place], [place], 1 tab(s) Oral daily  furosemide 20 mg oral tablet: = 2 tab(s) ( 40 mg ), po, daily, # 180 tab(s), 1 Refill(s), Type: Maintenance, Pharmacy: [facility] [place], [place], 2 tab(s) Oral daily.     Diagnosis     HLD (Hyperlipidemia) (DEY96-MH E78.00).     Course:  Progressing as expected.    Orders     Orders (Selected)   Prescriptions  Prescribed  simvastatin 40 mg oral tablet: = 1 tab(s) ( 40 mg ), po, hs, # 90 tab(s), 1 Refill(s), Type: Maintenance, Pharmacy: [facility] [place], [place], 1 tab(s) Oral hs.     Diagnosis     Type 2 diabetes mellitus (YIZ62-AV E11.9).     Course:  Well controlled.    Orders     Orders (Selected)   Prescriptions  Prescribed  HumaLOG KwikPen 100 units/mL injectable solution: ( 7 unit(s) ), subcutaneous, tidac, # 2 syringes, 5 Refill(s), Type: Maintenance, Pharmacy: [facility] [place], [place], Dispense 90 day supply, 7 unit(s) Subcutaneous tidac  Levemir FlexTouch 100 units/mL subcutaneous solution: See Instructions, Instructions: 40 units sc QHS, # 5 syringes, 3 Refill(s), Type: Maintenance, Pharmacy: [facility] [place], [place], 40 units sc  QHS.     Diagnosis     Infection of lower respiratory tract (HSK17-ZO J22).     Course:  Worsening.    Orders     Orders (Selected)   Prescriptions  Prescribed  Medrol Dosepak 4 mg oral tablet: = 1 packet(s), PO, Once, Instructions: as directed on package labeling, # 21 tab(s), 0 Refill(s), Type: Soft Stop, Pharmacy: [facility] [place], [place], 1 packet(s) Oral once,Instr:as directed on package labeling  azithromycin 250 mg oral tablet: = 1 tab(s) ( 250 mg ), PO, Daily, # 7 tab(s), 0 Refill(s), Type: Maintenance, Pharmacy: [facility] [place], 1 tab(s) Oral daily,x7 day(s)  Documented Medications  Discontinued  benzonatate 100 mg oral capsule: = 1 cap(s) ( 100 mg ), Oral, tid, x 7 day(s), 0 Refill(s), Type: Acute.        Visit Information      Date of Service: 11/21/2019 10:08 am  Performing Location: [facility] [place]  Encounter#: [id number]      Primary Care Provider (PCP):  Thony Salazar MD# [id number]      Referring Provider:  Thony Salazar MD# [id number]   Visit type:  Scheduled follow-up.    Accompanied by:  No one.    Source of history:  Self.    History limitation:  None.       Chief Complaint   Chief complaint discussed and confirmed correct.     11/21/2019 10:20 AM CST  Pt here for med ck and cough ER f/u        History of Present Illness             The patient presents for follow-up evaluation of diabetes.  The quality of the patient's diabetes is described as being unchanged from previous visit.  Relieving factors consist of diet changes, increased activity and medication.  Associated symptoms consist of none.  Medical encounters: none.  Compliance problems: none.  Glucose results: within target range.  Hemoglobin A1c results: > 6% and within target range.  Lifestyle modification: weight reduction, diet, increased physical activity.  Medications:.  Additional pertinent history: last eye exam: 4/15/2019 and last podiatric foot exam: 11/21/2019.  Notes.                The patient presents for follow-up evaluation of hypertension.  The quality of hypertension symptom(s) since the patient's last visit is described as being unchanged.  The severity of the hypertension symptom(s) since the last visit is moderate.  Since the patient's last visit, the timing/course of hypertension symptom(s) is constant.  Exacerbating factors consist of none.  Relieving factors consist of medication.  Associated symptoms consist of none.  Prior treatment consists of lifestyle modification (weight reduction, dietary sodium restriction, increased physical activity, adoption of DASH eating plan).  Medical encounters: none.  Compliance problems: none.        Interval History   Cholesterol Management   Total cholesterol results < 200 mg/dL (optimal).  LDL cholesterol results < 100 mg/dL (optimal).  HDL cholesterol results >40mg/dl.  Triglyceride results < 150 mg/dL (normal).  The course is progressing as expected.  The effect on daily activities is no change in activity level and no change in eating habits.  Associated symptoms characterized by no fatigue, chest pain, joint pain, muscle weakness or myalgias.        Review of Systems   /   Constitutional:  Negative.    Eye:  Negative.    Ear/Nose/Mouth/Throat:  Nasal congestion.    Respiratory:  Cough, chest wall pain with cough.    Cardiovascular:  Negative.    Gastrointestinal:  Negative.    Genitourinary:  Negative.    Hematology/Lymphatics:  Negative.    Endocrine:  Negative.    Immunologic:  Negative.    Musculoskeletal:  Back pain.    Integumentary   Neurologic:  Negative.    Psychiatric:  Negative.    All other systems reviewed and negative      Health Status   Allergies:    Allergic Reactions (Selected)  Moderate  Neosporin (Skin rash)   Medications:  (Selected)   Prescriptions  Prescribed  Alavert D-12 oral tablet, extended release: 1 tab(s), Oral, q12 hrs, # 180 tab(s), 1 Refill(s), Type: Maintenance, Pharmacy: [facility]  [place], [place], 1 tab(s) Oral q12 hrs  Aspir 81 oral delayed release tablet: 1 tab(s) ( 81 mg ), PO, Daily, # 100 tab(s), 0 Refill(s), Type: Maintenance, Pharmacy: [facility] [place], [place], 1 tab(s) po daily  Ronak Contour next test strips: Ronak Contour next test strips, See Instructions, Instructions: Testing BID, Supply, # 200 EA, 1 Refill(s), Type: Maintenance, Pharmacy: [facility], Testing BID  CONTOUR NEXT TEST Strips: CONTOUR NEXT TEST Strips, See Instructions, Instructions: USE ONE STRIP TO CHECK GLUCOSE BID  DX: E11.9, Supply, # 120 EA, 5 Refill(s), Type: Soft Stop, Pharmacy: [facility] [place], USE ONE STRIP TO CHECK GLUCOSE BID; DX: E11.9  HumaLOG KwikPen 100 units/mL injectable solution: ( 7 unit(s) ), subcutaneous, tidac, # 2 syringes, 5 Refill(s), Type: Maintenance, Pharmacy: [facility] [place], Dispense 90 day supply, 7 unit(s) Subcutaneous tidac  Levemir FlexTouch 100 units/mL subcutaneous solution: See Instructions, Instructions: 40 units sc QHS, # 5 syringes, 3 Refill(s), Type: Maintenance, Pharmacy: [facility] [place], 40 units sc QHS  Medrol Dosepak 4 mg oral tablet: = 1 packet(s), PO, Once, Instructions: as directed on package labeling, # 21 tab(s), 0 Refill(s), Type: Soft Stop, Pharmacy: [facility] [place], 1 packet(s) Oral once,Instr:as directed on package labeling  Relion Pen Needles [id number] MIS: Relion Pen Needles [id number] MIS, See Instructions, Instructions: for DM E11.9 use with insulin injection QID, Supply, # 120 EA, 5 Refill(s), Type: Maintenance, Pharmacy: [facility] [place], for DM E11.9 use with insulin injection QID  allopurinol 100 mg oral tablet: = 1 tab(s) ( 100 mg ), PO, daily, # 90 tab(s), 1 Refill(s), Type: Maintenance, Pharmacy: [place], [place], 1 tab(s) Oral daily  atenolol-chlorthalidone 50 mg-25 mg oral tablet: 1 tab(s), po, daily, # 90 tab(s), 1  Refill(s), Type: Maintenance, [facility] [place], 1 tab(s) Oral daily  azithromycin 250 mg oral tablet: = 1 tab(s) ( 250 mg ), PO, Daily, # 7 tab(s), 0 Refill(s), Type: Maintenance, Pharmacy: [facility] [place], 1 tab(s) Oral daily,x7 day(s)  betamethasone valerate 0.1% topical cream: 1 adriel, TOP, BID, # 45 g, 1 Refill(s), Type: Maintenance, Pharmacy: [facility] [place], [place], 1 adriel Topical bid  cyclobenzaprine 10 mg oral tablet: = 1 tab(s) ( 10 mg ), PO, q6 hrs, PRN: for spasm, # 20 tab(s), 0 Refill(s), Type: Maintenance, Pharmacy: [facility] [place], 1 tab(s) Oral q6 hrs,PRN:for spasm  furosemide 20 mg oral tablet: = 2 tab(s) ( 40 mg ), po, daily, # 180 tab(s), 1 Refill(s), Type: Maintenance, Pharmacy: [facility] [place], 2 tab(s) Oral daily  oxybutynin 5 mg oral tablet: = 1 tab(s) ( 5 mg ), po, bid, # 180 tab(s), 1 Refill(s), Type: Maintenance, Pharmacy: [facility] [place], 1 tab(s) Oral bid  simvastatin 40 mg oral tablet: = 1 tab(s) ( 40 mg ), po, hs, # 90 tab(s), 1 Refill(s), Type: Maintenance, Pharmacy: [facility] [place], 1 tab(s) Oral hs   Problem list:    All Problems  Allergic Rhinitis / ICD-9- / Confirmed  Asthma / ICD-9-.90 / Confirmed  Basal cell carcinoma / ICD-9-.91 / Confirmed  Chronic Renal Disease, Stage III / ICD-9-.3 / Confirmed  Current use of insulin / SNOMED CT 8107465371 / Confirmed  Gout / SNOMED CT 530895791 / Confirmed  HLD (Hyperlipidemia) / SNOMED CT 571041954 / Confirmed  HTN (hypertension) / SNOMED CT 4666551839 / Confirmed  Impaired circulation of left lower extremity / SNOMED CT 40996390 / Confirmed  Incontinence / SNOMED CT 38588520 / Confirmed  Menopause / ICD-9-.2 / Confirmed  OA (Osteoarthritis) / ICD-9-.90 / Confirmed  Obesity / ICD-9-.00 / Confirmed  Overactive bladder / ICD-9-.51 / Confirmed  Type 2 diabetes  mellitus / SNOMED CT 252291047 / Confirmed  Inactive: Tobacco abuse / ICD-9-.1  Resolved: *Hospitalized @ [facility] Chest pain  Canceled: Hyperlipemia / ICD-9-.4      Histories   Past Medical History:    Active  Obesity (278.00)  Type 2 diabetes mellitus (939508688)  HTN (hypertension) (4290396890)  OA (Osteoarthritis) (715.90)  Comments:  2010 CST 2:32 PM CST - Milena Lowry LPN  right knee  Asthma (493.90)  Chronic Renal Disease, Stage III (585.3)  Incontinence (19932193)  Comments:  2010 CST 2:33 PM [facility] [place] LPN, Milena  urge  Overactive bladder (596.51)  Menopause (627.2)  Allergic Rhinitis (477)  Resolved  *Hospitalized @ [facility] - Chest pain:  Resolved.   Family History:    Cancer  Grandmother (M)  Emphysema of lung  Father     Procedure history:    Anal sphincterotomy (SNOMED CT 061198193) on 2005 at 60 Years.  Comments:  2013 1:31 PM [facility] - Zohra Terrazas MD  Carpal tunnel release (SNOMED CT 411005594) in  at 43 Years.  Comments:  2010 10:57 AM T - Milena Lowry LPN  bilateral   section (SNOMED CT 80096932) in  at 34 Years.  Appendectomy (SNOMED CT 750295982) in 1952 at 7 Years.  Tonsillectomy (SNOMED CT 386457528) in 1947 at 13 Months.   Social History:        Alcohol Assessment: Denies Alcohol Use            Never      Tobacco Assessment: Past            Past      Substance Abuse Assessment            Never      Employment and Education Assessment            Retired      Home and Environment Assessment            Marital status: .  Lives with Self.  1 children.  Living situation: Home/Independent.      Exercise and Physical Activity Assessment: Does not exercise            Exercise frequency: No regular exercise..        Physical Examination   Vital signs reviewed  and within acceptable limits    Vital Signs   2019 10:20 AM CST Temperature Tympanic 97.4 DegF  LOW    Peripheral Pulse Rate 72 bpm     Systolic Blood Pressure 130 mmHg    Diastolic Blood Pressure 84 mmHg  HI    Mean Arterial Pressure 99 mmHg    Oxygen Saturation 98 %      Measurements from flowsheet : Measurements   11/21/2019 10:20 AM CST Height Measured - Standard 62 in    Weight Measured - Standard 268 lb    BSA 2.3 m2    Body Mass Index 49.01 kg/m2  HI      General:  Alert and oriented, No acute distress, elevated BMI.    HENT:  Normocephalic.    Neck:  Supple, No lymphadenopathy, No thyromegaly.    Respiratory:  Lungs are clear to auscultation, Respirations are non-labored, Breath sounds are equal, Symmetrical chest wall expansion.    Cardiovascular:  Normal rate, Regular rhythm, No murmur, No gallop, Good pulses equal in all extremities, Normal peripheral perfusion, No edema.    Gastrointestinal:  Soft, Non-tender, Non-distended, Normal bowel sounds, No organomegaly.    Musculoskeletal:  Normal range of motion, No swelling, No deformity.         Upper extremity exam: Shoulder ( Right, No deformity, No erythema, No ecchymosis, No swelling, Tenderness, No wound, No crepitus, No numbness, Strength  5 /5, Range of motion ( Diminished ) ).    Integumentary:  Warm, Dry, Pink, No foot ulcers or skin lesions..    Feet:  Normal by visual exam, Sensation intact, By monofilament exam.    Neurologic:  Alert, Oriented, Normal sensory, Normal motor function, No focal deficits.    Psychiatric:  Cooperative, Appropriate mood & affect.       Health Maintenance   Immunizations      Review / Management   Results review

## 2022-02-15 NOTE — PROGRESS NOTES
Patient:   BOO ESCOBAR            MRN: [id number]            FIN: [id number]               Age:   72 years     Sex:  Female     :  1945   Associated Diagnoses:   HTN (hypertension); HLD (Hyperlipidemia); Type 2 diabetes mellitus   Author:   Martin CH, Thony      Impression and Plan   Diagnosis     HTN (hypertension) ([id number] I10).     Course:  Well controlled.    Orders     Orders   Charges (Evaluation and Management):  [id number] office outpatient visit 25 minutes (Charge) (Order): Quantity: 1, Type 2 diabetes mellitus  HLD (Hyperlipidemia)  HTN (hypertension).     Orders (Selected)   Prescriptions  Prescribed  atenolol-chlorthalidone 50 mg-25 mg oral tablet: 1 tab(s), po, daily, # 90 tab(s), 1 Refill(s), Type: Maintenance, Pharmacy: [facility] , 1 tab(s) po daily  lisinopril 10 mg oral tablet: 1 tab(s) ( 10 mg ), PO, Daily, # 90 tab(s), 1 Refill(s), Type: Maintenance, Pharmacy: [facility] , 1 tab(s) po daily.     Diagnosis     HLD (Hyperlipidemia) (HBO28-CS E78.0).     Course:  Progressing as expected.    Orders     Orders (Selected)   Prescriptions  Prescribed  simvastatin 40 mg oral tablet: 1 tab(s) ( 40 mg ), po, hs, # 90 tab(s), 1 Refill(s), Type: Maintenance, Pharmacy: [facility] , 1 tab(s) po hs.     Diagnosis     Type 2 diabetes mellitus (WOQ31-NI E11.9).     Course:  Well controlled.    Orders     Orders (Selected)   Prescriptions  Prescribed  HumaLOG KwikPen 100 units/mL injectable solution: ( 4 unit(s) ), subcutaneous, tidac, # 5 syringes, 3 Refill(s), Type: Maintenance, Pharmacy: [facility] , Dispense 90 day supply, 4 unit(s) subcutaneous tidac  Levemir FlexTouch 100 units/mL subcutaneous solution: See Instructions, Instructions: 36 units sc QHS, # 5 syringes, 3 Refill(s), Type: Maintenance, Pharmacy: [facility] , 36 units sc QHS.        Visit Information      Date of Service: 2018 11:29 am  Performing Location: [facility]  [place]  Encounter#: [id number]      Primary Care Provider (PCP):  Thony Salazar MD# [id number]      Referring Provider:  Thony Salazar MD# [id number]   Visit type:  Scheduled follow-up.    Accompanied by:  No one.    Source of history:  Self.    History limitation:  None.       Chief Complaint   Chief complaint discussed and confirmed correct.     3/14/2018 11:51 AM CDT   Pt here for med ck        History of Present Illness             The patient presents for follow-up evaluation of diabetes.  The quality of the patient's diabetes is described as being unchanged from previous visit.  Relieving factors consist of diet changes, increased activity and medication.  Associated symptoms consist of none.  Medical encounters: none.  Compliance problems: none.  Glucose results: within target range.  Hemoglobin A1c results: > 6% and within target range.  Lifestyle modification: weight reduction, diet, increased physical activity.  Medications:.  Additional pertinent history: last podiatric foot exam: 11/6/2017 and eye exam recommended.  No reported episodes of hypoglycemia.               The patient presents for follow-up evaluation of hypertension.  The quality of hypertension symptom(s) since the patient's last visit is described as being unchanged.  The severity of the hypertension symptom(s) since the last visit is moderate.  Since the patient's last visit, the timing/course of hypertension symptom(s) is constant.  Exacerbating factors consist of none.  Relieving factors consist of medication.  Associated symptoms consist of none.  Prior treatment consists of lifestyle modification (weight reduction, dietary sodium restriction, increased physical activity, adoption of DASH eating plan).  Medical encounters: none.  Compliance problems: none.        Interval History   Cholesterol Management   Total cholesterol results < 200 mg/dL (optimal).  LDL cholesterol results < 100 mg/dL (optimal).  HDL cholesterol  results >40mg/dl.  Triglyceride results < 150 mg/dL (normal).  The course is progressing as expected.  The effect on daily activities is no change in activity level and no change in eating habits.  Associated symptoms characterized by no fatigue, chest pain, joint pain, muscle weakness or myalgias.        Review of Systems   /   Constitutional:  Negative.    Eye:  Negative.    Ear/Nose/Mouth/Throat:  Negative.    Respiratory:  Negative.    Cardiovascular:  Negative.    Gastrointestinal:  Negative.    Genitourinary:  Negative.    Hematology/Lymphatics:  Negative.    Endocrine:  Negative.    Immunologic:  Negative.    Musculoskeletal:  Back pain.    Integumentary   Neurologic:  Negative.    Psychiatric:  Negative.    All other systems reviewed and negative      Health Status   Allergies:    Allergic Reactions (Selected)  Moderate  Neosporin (Skin rash)   Medications:  (Selected)   Prescriptions  Prescribed  Aspir 81 oral enteric coated tablet: 1 tab(s) ( 81 mg ), PO, Daily, # 100 tab(s), 0 Refill(s), Type: Maintenance, OTC (Rx)  Ronak Contour next test strips: [employer] Contour next test strips, See Instructions, Instructions: Testing BID, Supply, # 200 EA, 1 Refill(s), Type: Maintenance, Pharmacy: [facility], Testing BID  CONTOUR NEXT TEST Strips: CONTOUR NEXT TEST Strips, See Instructions, Instructions: USE ONE STRIP TO CHECK GLUCOSE TWICE DAILY  DX: E11.9, Supply, # 180 EA, 1 Refill(s), Type: Soft Stop, Pharmacy: [facility], USE ONE STRIP TO CHECK GLUCOSE TWICE DAILY; DX: E11.9  HumaLOG KwikPen 100 units/mL injectable solution: ( 4 unit(s) ), subcutaneous, tidac, # 5 syringes, 3 Refill(s), Type: Maintenance, Pharmacy: [facility], Dispense 90 day supply, 4 unit(s) subcutaneous tidac  Levemir FlexTouch 100 units/mL subcutaneous solution: See Instructions, Instructions: 36 units sc QHS, # 5 syringes, 3 Refill(s), Type: Maintenance, Pharmacy: [facility], 36 units sc  QHS  Loratadine-D 12 Hour oral tablet, extended release: 1 tab(s), po, q12 hrs, # 180 tab(s), 1 Refill(s), Type: Maintenance, Pharmacy: [facility], 1 tab(s) po q12 hrs  Relion Pen Needles [id number] MIS: Relion Pen Needles [id number] MIS, See Instructions, Instructions: use with insulin injection QID, Supply, # 120 EA, 1 Refill(s), Type: Maintenance, Pharmacy: [facility], use with insulin injection QID  allopurinol 100 mg oral tablet: 1 tab(s) ( 100 mg ), PO, daily, # 90 tab(s), 1 Refill(s), Type: Maintenance, Pharmacy: [facility], 1 tab(s) po daily  atenolol-chlorthalidone 50 mg-25 mg oral tablet: 1 tab(s), po, daily, # 90 tab(s), 1 Refill(s), Type: Maintenance, Pharmacy: [facility], 1 tab(s) po daily  betamethasone valerate 0.1% topical cream: 1 adriel, TOP, BID, # 45 g, 1 Refill(s), Type: Maintenance, Pharmacy: [facility], 1 adriel top bid  furosemide 20 mg oral tablet: 1 tab(s) ( 20 mg ), po, daily, # 90 tab(s), 1 Refill(s), Type: Maintenance, Pharmacy: [facility], 1 tab(s) po daily  lisinopril 10 mg oral tablet: 1 tab(s) ( 10 mg ), PO, Daily, # 90 tab(s), 1 Refill(s), Type: Maintenance, Pharmacy: [facility], 1 tab(s) po daily  oxybutynin 5 mg oral tablet: 1 tab(s) ( 5 mg ), po, bid, # 180 tab(s), 1 Refill(s), Type: Maintenance, Pharmacy: [facility], 1 tab(s) po bid  simvastatin 40 mg oral tablet: 1 tab(s) ( 40 mg ), po, hs, # 90 tab(s), 1 Refill(s), Type: Maintenance, Pharmacy: [facility], 1 tab(s) po hs   Problem list:    All Problems  Allergic Rhinitis / ICD-9- / Confirmed  Asthma / ICD-9-.90 / Confirmed  Basal cell carcinoma / ICD-9-.91 / Confirmed  Chronic Renal Disease, Stage III / ICD-9-.3 / Confirmed  Gout / SNOMED CT 401150287 / Confirmed  HLD (Hyperlipidemia) / SNOMED CT 844560489 / Confirmed  HTN (hypertension) / SNOMED CT 1910041291 / Confirmed  Impaired circulation of left lower extremity  / SNOMED CT 71686654 / Confirmed  Incontinence / SNOMED CT 18244876 / Confirmed  Menopause / ICD-9-.2 / Confirmed  OA (Osteoarthritis) / ICD-9-.90 / Confirmed  Obesity / ICD-9-.00 / Confirmed  Overactive bladder / ICD-9-.51 / Confirmed  Type 2 diabetes mellitus / SNOMED CT 503845389 / Confirmed  Inactive: Tobacco abuse / ICD-9-.1  Resolved: *Hospitalized @ [facility] Chest pain  Canceled: Hyperlipemia / ICD-9-.4      Histories   Past Medical History:    Active  Obesity (278.00)  Type 2 diabetes mellitus (229522900)  HTN (hypertension) (7324066601)  OA (Osteoarthritis) (715.90)  Comments:  2010 CST 2:32 PM CST - Milena Lowry LPN  right knee  Asthma (493.90)  Chronic Renal Disease, Stage III (585.3)  Incontinence (53079197)  Comments:  2010 CST 2:33 PM CST - Shackleton LPN, Milena  urge  Overactive bladder (596.51)  Menopause (627.2)  Allergic Rhinitis (477)  Resolved  *Hospitalized @ [facility] Chest pain:  Resolved.   Family History:    Cancer  Grandmother (M)  Emphysema of lung  Father     Procedure history:    Anal sphincterotomy (SNOMED CT 111609262) on 2005 at 60 Years.  Comments:  2013 1:31 PM - Zohra Terrazas MD  Carpal tunnel release (SNOMED CT 800087000) in  at 43 Years.  Comments:  2010 10:57 AM - Milena Lowry LPN  bilateral   section (SNOMED CT 69981889) in  at 34 Years.  Appendectomy (SNOMED CT 091489374) in 1952 at 7 Years.  Tonsillectomy (SNOMED CT 072759208) in 1947 at 13 Months.   Social History:        Alcohol Assessment: Denies Alcohol Use            Never      Tobacco Assessment: Past            Past      Substance Abuse Assessment            Never      Employment and Education Assessment            Retired      Home and Environment Assessment            Marital status: .  Lives with Self.  1 children.  Living situation: Home/Independent.      Exercise and Physical Activity  Assessment: Does not exercise            Exercise frequency: No regular exercise..        Physical Examination   Vital signs reviewed  and within acceptable limits    Vital Signs   3/14/2018 11:51 AM CDT Peripheral Pulse Rate 66 bpm    Systolic Blood Pressure 128 mmHg    Diastolic Blood Pressure 66 mmHg    Mean Arterial Pressure 87 mmHg    BP Site Left arm      Measurements from flowsheet : Measurements   3/14/2018 11:51 AM CDT Height Measured - Standard 62 in    Weight Measured - Standard 264.4 lb    BSA 2.29 m2    Body Mass Index 48.35 kg/m2  HI      General:  Alert and oriented, No acute distress, elevated BMI.    HENT:  Normocephalic.    Neck:  Supple, No lymphadenopathy, No thyromegaly.    Respiratory:  Lungs are clear to auscultation, Respirations are non-labored, Breath sounds are equal, Symmetrical chest wall expansion.    Cardiovascular:  Normal rate, Regular rhythm, No murmur, No gallop, Good pulses equal in all extremities, Normal peripheral perfusion, No edema.    Gastrointestinal:  Soft, Non-tender, Non-distended, Normal bowel sounds, No organomegaly.    Musculoskeletal:  Normal range of motion, No swelling, No deformity.         Upper extremity exam: Shoulder ( Right, No deformity, No erythema, No ecchymosis, No swelling, Tenderness, No wound, No crepitus, No numbness, Strength  5 /5, Range of motion ( Diminished ) ).    Integumentary:  Warm, Dry, Pink, No foot ulcers or skin lesions..    Feet:  Normal by visual exam, Sensation intact, By monofilament exam.    Neurologic:  Alert, Oriented, Normal sensory, Normal motor function, No focal deficits.    Psychiatric:  Cooperative, Appropriate mood & affect.       Health Maintenance   Immunizations      Review / Management   Results review

## 2022-02-15 NOTE — LETTER
(Inserted Image. Unable to display)   [address] [facility] [address]  November 25, 2019      BOO ESCOBAR  [address] [place] [address] [phone]        Dear BOO,     Thank you for selecting [facility] for your healthcare needs. Below you will find the results of the recent tests done at our clinic.      The A1c (long term diabetes test) remains above the goal of 8.0 or less.  I recommend increasing the amount of Novolog insulin you take with each meal.  My record indicates you take about 7-8 units now.  Try increasing that to 10 units with each meal and repeat the A1c in two months.      Result Name Current Result Previous Result Reference Range   Hgb A1c ((H)) 8.7 11/21/2019 ((H)) 8.6 8/15/2019  - <5.7       Please contact my practice at [phone]  if you have any questions or concerns.     Sincerely,        Thony Salazar MD          What do your labs mean?  Below is a glossary of commonly ordered labs:  LDL   Bad Cholesterol   HDL   Good Cholesterol  AST/ALT   Liver Function   Cr/Creatinine   Kidney Function  Microalbumin   Kidney Function  BUN   Kidney Function  PSA   Prostate    TSH   Thyroid Hormone  HgbA1c   Diabetes Test   Hgb (Hemoglobin)   Red Blood Cells

## 2022-02-15 NOTE — NURSING NOTE
Comprehensive Intake Entered On:  8/15/2019 1:42 PM CDT    Performed On:  8/15/2019 1:39 PM CDT by Grace Dumont CMA               Summary   Chief Complaint :   Pt here for back pain   Weight Measured :   268 lb(Converted to: 268 lb 0 oz, 121.56 kg)    Height Measured :   62 in(Converted to: 5 ft 2 in, 157.48 cm)    Body Mass Index :   49.01 kg/m2 (HI)    Body Surface Area :   2.3 m2   Systolic Blood Pressure :   130 mmHg   Diastolic Blood Pressure :   74 mmHg   Mean Arterial Pressure :   93 mmHg   Peripheral Pulse Rate :   70 bpm   Temperature Tympanic :   97.4 DegF(Converted to: 36.3 DegC)  (LOW)    Oxygen Saturation :   95 %   Grace Dumont CMA - 8/15/2019 1:39 PM CDT   Health Status   Allergies Verified? :   Yes   Medication History Verified? :   Yes   Medical History Verified? :   Yes   Pre-Visit Planning Status :   Not completed   Tobacco Use? :   Former smoker   Grace Dumont CMA - 8/15/2019 1:39 PM CDT   Consents   Consent for Immunization Exchange :   Consent Granted   Consent for Immunizations to Providers :   Consent Granted   Grace Dumont CMA - 8/15/2019 1:39 PM CDT   Meds / Allergies   (As Of: 8/15/2019 1:42:10 PM CDT)   Allergies (Active)   Neosporin  Estimated Onset Date:   <not entered> 2012 ; Reactions:   skin rash ; Created By:   Thony Salazar MD; Reaction Status:   Active ; Category:   Drug ; Substance:   Neosporin ; Type:   Allergy ; Severity:   Moderate ; Updated By:   Thony Salazar MD; Reviewed Date:   8/15/2019 1:39 PM CDT        Medication List   (As Of: 8/15/2019 1:42:10 PM CDT)   Prescription/Discharge Order    allopurinol  :   allopurinol ; Status:   Prescribed ; Ordered As Mnemonic:   allopurinol 100 mg oral tablet ; Simple Display Line:   100 mg, 1 tab(s), PO, daily, 90 tab(s), 1 Refill(s) ; Ordering Provider:   Thony Salazar MD; Catalog Code:   allopurinol ; Order Dt/Tm:   4/24/2019 2:14:00 PM          aspirin  :   aspirin ; Status:   Prescribed ; Ordered As Mnemonic:    Aspir 81 oral delayed release tablet ; Simple Display Line:   81 mg, 1 tab(s), PO, Daily, 100 tab(s), 0 Refill(s) ; Ordering Provider:   Thony Salazar MD; Catalog Code:   aspirin ; Order Dt/Tm:   3/26/2018 1:30:54 PM          atenolol-chlorthalidone  :   atenolol-chlorthalidone ; Status:   Prescribed ; Ordered As Mnemonic:   atenolol-chlorthalidone 50 mg-25 mg oral tablet ; Simple Display Line:   1 tab(s), po, daily, 90 tab(s), 1 Refill(s) ; Ordering Provider:   Thony Salazar MD; Catalog Code:   atenolol-chlorthalidone ; Order Dt/Tm:   4/24/2019 2:13:27 PM          betamethasone topical  :   betamethasone topical ; Status:   Prescribed ; Ordered As Mnemonic:   betamethasone valerate 0.1% topical cream ; Simple Display Line:   1 adriel, TOP, BID, 45 g, 1 Refill(s) ; Ordering Provider:   Thony Salazar MD; Catalog Code:   betamethasone topical ; Order Dt/Tm:   3/26/2018 1:30:02 PM          furosemide  :   furosemide ; Status:   Prescribed ; Ordered As Mnemonic:   furosemide 20 mg oral tablet ; Simple Display Line:   40 mg, 2 tab(s), po, daily, 180 tab(s), 1 Refill(s) ; Ordering Provider:   Thony Salazar MD; Catalog Code:   furosemide ; Order Dt/Tm:   4/24/2019 2:13:34 PM          insulin detemir  :   insulin detemir ; Status:   Prescribed ; Ordered As Mnemonic:   Levemir FlexTouch 100 units/mL subcutaneous solution ; Simple Display Line:   See Instructions, 40 units sc QHS, 5 syringes, 3 Refill(s) ; Ordering Provider:   Thony Salazar MD; Catalog Code:   insulin detemir ; Order Dt/Tm:   4/24/2019 2:11:53 PM          insulin lispro  :   insulin lispro ; Status:   Prescribed ; Ordered As Mnemonic:   HumaLOG KwikPen 100 units/mL injectable solution ; Simple Display Line:   5 unit(s), subcutaneous, tidac, 2 syringes, 5 Refill(s) ; Ordering Provider:   Thony Salazar MD; Catalog Code:   insulin lispro ; Order Dt/Tm:   8/14/2019 3:54:51 PM          insulin lispro  :   insulin lispro ; Status:   Completed ; Ordered As  Mnemonic:   HumaLOG KwikPen 100 units/mL injectable solution ; Simple Display Line:   5 unit(s), subcutaneous, tidac, 2 syringes, 5 Refill(s) ; Ordering Provider:   Thony Salazar MD; Catalog Code:   insulin lispro ; Order Dt/Tm:   4/24/2019 2:14:13 PM          loratadine-pseudoephedrine  :   loratadine-pseudoephedrine ; Status:   Prescribed ; Ordered As Mnemonic:   Alavert D-12 oral tablet, extended release ; Simple Display Line:   1 tab(s), Oral, q12 hrs, 180 tab(s), 1 Refill(s) ; Ordering Provider:   Thony Salazar MD; Catalog Code:   loratadine-pseudoephedrine ; Order Dt/Tm:   6/18/2019 3:11:31 PM          Miscellaneous Prescription  :   Miscellaneous Prescription ; Status:   Prescribed ; Ordered As Mnemonic:   Relion Pen Needles [id number] MIS ; Simple Display Line:   See Instructions, for DM E11.9 use with insulin injection QID, 120 EA, 5 Refill(s) ; Ordering Provider:   Thony Salazar MD; Catalog Code:   Miscellaneous Prescription ; Order Dt/Tm:   4/24/2019 2:12:02 PM          Miscellaneous Rx Supply  :   Miscellaneous Rx Supply ; Status:   Prescribed ; Ordered As Mnemonic:   Ronak Contour next test strips ; Simple Display Line:   See Instructions, Testing BID, 200 EA, 1 Refill(s) ; Ordering Provider:   Thony Salazar MD; Catalog Code:   Miscellaneous Rx Supply ; Order Dt/Tm:   9/12/2016 11:07:30 AM          Miscellaneous Rx Supply  :   Miscellaneous Rx Supply ; Status:   Prescribed ; Ordered As Mnemonic:   CONTOUR NEXT TEST Strips ; Simple Display Line:   See Instructions, USE ONE STRIP TO CHECK GLUCOSE QID  DX: E11.9, 120 EA, 5 Refill(s) ; Ordering Provider:   Thony Salazar MD; Catalog Code:   Miscellaneous Rx Supply ; Order Dt/Tm:   4/24/2019 2:12:38 PM          oxybutynin  :   oxybutynin ; Status:   Prescribed ; Ordered As Mnemonic:   oxybutynin 5 mg oral tablet ; Simple Display Line:   5 mg, 1 tab(s), po, bid, 180 tab(s), 1 Refill(s) ; Ordering Provider:   Thony Salazar MD; Catalog Code:   oxybutynin ;  Order Dt/Tm:   4/24/2019 2:13:44 PM          simvastatin  :   simvastatin ; Status:   Prescribed ; Ordered As Mnemonic:   simvastatin 40 mg oral tablet ; Simple Display Line:   40 mg, 1 tab(s), po, hs, 90 tab(s), 1 Refill(s) ; Ordering Provider:   Thony Salazar MD; Catalog Code:   simvastatin ; Order Dt/Tm:   4/24/2019 2:13:51 PM

## 2022-02-15 NOTE — PROGRESS NOTES
Chief Complaint    c/o L lower back pain x 4 days; pain is getting.  History of Present Illness      Wendy is in the morbidly obese 74-year-old with diabetes and chronic kidney disease who presents with 3 days of back pain.  No falls or trauma.  No fever or history of cancer.  Pain is relieved by lying down tends to be worse with sitting.  She has had episodic back pain but no chronic back pain.  She has chronic incontinence but no urinary retention.  No bowel complaints.  No paresthesias nor weakness in her legs.  She is taken nothing for pain.  Review of Systems      No cough, chest pain, dyspnea, fever, chills, abdominal pain, headache.  Physical Exam   Vitals & Measurements    T: 97.6   F (Tympanic)  HR: 70(Peripheral)  BP: 142/78  SpO2: 96%     HT: 62 in  WT: 260 lb  BMI: 47.55       Patient appears comfortable and in no acute distress.  Alert and oriented.  Morbidly obese woman who walks steadily with a wheeled walker.  Chest is clear.  Heart exam is regular.  No pitting edema.  Back reveals some generalized paraspinous tenderness on the left but no spinal tenderness.  Hips and pelvis nontender.  Strength in the legs is normal.  Sensation intact in both feet to light touch.  Assessment/Plan       Acute left-sided low back pain (M54.5)        Acute musculoskeletal low back pain.  No red flag type symptoms or signs.  Recommended Tylenol 650 mg 4 times daily and to return if not improving or worsening in any way.         Ordered:          54242 office outpatient visit 25 minutes (Charge), Quantity: 1, Acute left-sided low back pain  Chronic Renal Disease, Stage III  Type 2 diabetes mellitus  Obesity  Overactive bladder                Chronic Renal Disease, Stage III (N18.3)         Unchanged         Ordered:          29884 office outpatient visit 25 minutes (Charge), Quantity: 1, Acute left-sided low back pain  Chronic Renal Disease, Stage III  Type 2 diabetes mellitus  Obesity  Overactive bladder                 Obesity (E66.9)         Unchanged         Ordered:          13594 office outpatient visit 25 minutes (Charge), Quantity: 1, Acute left-sided low back pain  Chronic Renal Disease, Stage III  Type 2 diabetes mellitus  Obesity  Overactive bladder                Overactive bladder (N31.8)         Chronic incontinence unchanged         Ordered:          17726 office outpatient visit 25 minutes (Charge), Quantity: 1, Acute left-sided low back pain  Chronic Renal Disease, Stage III  Type 2 diabetes mellitus  Obesity  Overactive bladder                Type 2 diabetes mellitus (E11.9)         On insulin         Ordered:          31847 office outpatient visit 25 minutes (Charge), Quantity: 1, Acute left-sided low back pain  Chronic Renal Disease, Stage III  Type 2 diabetes mellitus  Obesity  Overactive bladder           Patient Information     Name:BOO ESCOBAR      Address:      [address] [place] [id number]     Sex:Female     YOB: 1945     [phone]     Emergency Contact:MAGUE LUJAN     MRN:[id number]     FIN:[id number]     Location:[facility]     Date of Service:05/15/2020      Primary Care Physician:       Martin CH, Thony, [phone]      Attending Physician:       Froilan Kohli MD, [phone]  Problem List/Past Medical History    Ongoing     Allergic Rhinitis     Asthma     Basal cell carcinoma     Chronic Renal Disease, Stage III     Current use of insulin     Gout     HLD (Hyperlipidemia)     HTN (hypertension)     Impaired circulation of left lower extremity     Incontinence       Comments: urge     Menopause     OA (Osteoarthritis)       Comments: right knee     Obesity     Overactive bladder     Tobacco abuse       Comments: Former tobacco user.     Type 2 diabetes mellitus    Historical     *Hospitalized @ [facility] - Chest pain  Procedure/Surgical History     Anal sphincterotomy (09/30/2005)      Comments: Thony MAYNARD  MD Martin.     Carpal tunnel release ()      Comments: bilateral.      section ()     Appendectomy ()     Tonsillectomy ()  Medications    Alavert D-12 oral tablet, extended release, 1 tab(s), Oral, q12 hrs, 1 refills    allopurinol 100 mg oral tablet, 100 mg= 1 tab(s), Oral, daily, 1 refills    Aspir 81 oral delayed release tablet, 81 mg= 1 tab(s), Oral, daily    atenolol-chlorthalidone 50 mg-25 mg oral tablet, 1 tab(s), Oral, daily, 1 refills    Ronak Contour next test strips, See Instructions, 1 refills    betamethasone valerate 0.1% topical cream, 1 adriel, Topical, bid, 1 refills    CONTOUR NEXT TEST Strips, See Instructions, 5 refills    furosemide 20 mg oral tablet, 40 mg= 2 tab(s), Oral, daily, 1 refills    HumaLOG KwikPen 100 units/mL injectable solution, 10 units, Subcutaneous, tidac, 5 refills    Levemir FlexTouch 100 units/mL subcutaneous solution, See Instructions, 3 refills    Relion Pen Needles [id number] MIS, See Instructions, 5 refills    simvastatin 40 mg oral tablet, 40 mg= 1 tab(s), Oral, hs, 1 refills  Allergies    Neosporin (skin rash)  Social History    Smoking Status - 05/15/2020     Former smoker     Alcohol - Denies Alcohol Use, 2010      Never, 2013     Employment/School      Retired, 2013     Exercise - Does not exercise, 2010      Exercise frequency: No regular exercise.., 2013     Home/Environment      Marital status: . Lives with Self. 1 children. Living situation: Home/Independent., 2013     Substance Abuse      Never, 2013     Tobacco - Past, 2014      Past, 2013  Family History    Cancer: Grandmother (M).    Emphysema of lung: Father.  Immunizations      Vaccine Date Status          influenza virus vaccine, inactivated 2019 Given          influenza virus vaccine, inactivated 10/01/2018 Given          influenza virus vaccine, inactivated 2017 Given          pneumococcal (PCV13) 2015  Given          influenza virus vaccine, inactivated 09/10/2014 Recorded          tetanus/diphth/pertuss (Tdap) adult/adol 09/10/2014 Recorded          influenza virus vaccine, inactivated 10/16/2012 Given          influenza virus vaccine, inactivated 09/30/2009 Recorded          ZOS, shingles 10/07/2008 Recorded          pneumococcal (PPSV23) 02/01/2004 Recorded          Td 02/01/2004 Recorded          Td 11/07/1984 Recorded

## 2022-02-15 NOTE — LETTER
(Inserted Image. Unable to display)   [address] [place] [address]  November 30, 2020      BOO ESCOBAR  [address]   [place], [place] [id number]        Dear BOO,     Thank you for selecting [facility] for your healthcare needs. Below you will find the results of the recent tests done at our clinic.      Two problems identified by the lab tests. 1. The creatinine (measure of kidney function) has declined from 1.82 in April to 2.65 now.  I recommend a consult with my partner Dr. Baltazar Gonzalez who is a kidney specialist.  If you agree to this give Isabela or me a call and we will schedule it for you. 2. The liver enzymes ALT/AST have become elevated.  It is possible the Simvistatin is doing this so I recommend you DISCONTINUE the Simvastatin.  The other two substances which can be hard on the liver are alcohol and tylenol so it would be wise to avoid those as well.  Please call if you have any questions.      Result Name Current Result Previous Result Reference Range   Sodium Level (mmol/L)  143 11/25/2020  141 4/24/2019 135 - 146   Potassium Level (mmol/L)  3.7 11/25/2020  4.8 4/24/2019 3.5 - 5.3   Chloride Level (mmol/L) ((L)) 95 11/25/2020  103 4/24/2019 98 - 110   CO2 Level (mmol/L) ((H)) 38 11/25/2020  28 4/24/2019 20 - 32   Glucose Level (mg/dL) ((L)) 46 11/25/2020 ((H)) 140 4/24/2019 65 - 139   BUN (mg/dL) ((H)) 49 11/25/2020 ((H)) 38 4/24/2019 7 - 25   Creatinine Level (mg/dL) ((H)) 2.65 11/25/2020 ((H)) 1.82 4/24/2019 0.60 - 0.93   BUN/Creat Ratio  18 11/25/2020  21 4/24/2019 6 - 22   eGFR (mL/min/1.73m2) ((L)) 17 11/25/2020 ((L)) 27 4/24/2019 > OR = 60 -    eGFR  (mL/min/1.73m2) ((L)) 20 11/25/2020 ((L)) 32 4/24/2019 > OR = 60 -    Calcium Level (mg/dL)  9.9 11/25/2020  9.4 4/24/2019 8.6 - 10.4   Bilirubin Total (mg/dL)  0.6 11/25/2020  0.5 4/24/2019 0.2 - 1.2   Alkaline Phosphatase (unit/L)  149 11/25/2020  99 4/24/2019 37 - 153   AST/SGOT (unit/L)  ((H)) 77 11/25/2020  22 4/24/2019 10 - 35   ALT/SGPT (unit/L) ((H)) 102 11/25/2020  21 4/24/2019 6 - 29   Protein Total (gm/dL)  7.0 11/25/2020  6.6 4/24/2019 6.1 - 8.1   Albumin Level (gm/dL)  4.2 11/25/2020  3.8 4/24/2019 3.6 - 5.1   Globulin  2.8 11/25/2020  2.8 4/24/2019 1.9 - 3.7   A/G Ratio  1.5 11/25/2020  1.4 4/24/2019 1.0 - 2.5   Hgb A1c ((H)) 6.5 11/25/2020 ((H)) 8.7 11/21/2019  - <5.7   Cholesterol (mg/dL)  164 11/25/2020  167 4/24/2019  - <200   Non-HDL Cholesterol  105 11/25/2020  109 4/24/2019  - <130   HDL (mg/dL)  59 11/25/2020  58 4/24/2019 > OR = 50 -    Cholesterol/HDL Ratio  2.8 11/25/2020  2.9 4/24/2019  - <5.0   LDL  86 11/25/2020  89 4/24/2019    Triglyceride (mg/dL)  98 11/25/2020  107 4/24/2019  - <150   TSH (mIU/L)  3.36 11/25/2020  0.40 - 4.50   WBC ((H)) 13.0 11/25/2020 ((H)) 11.5 8/15/2019 3.8 - 10.8   RBC  4.33 11/25/2020  4.17 8/15/2019 3.80 - 5.10   Hgb (gm/dL)  14.2 11/25/2020  13.1 8/15/2019 11.7 - 15.5   Hct (%)  41.8 11/25/2020  40.5 8/15/2019 35.0 - 45.0   MCV (fL)  96.5 11/25/2020  97.1 8/15/2019 80.0 - 100.0   MCH (pg)  32.8 11/25/2020  31.4 8/15/2019 27.0 - 33.0   MCHC (gm/dL)  34.0 11/25/2020  32.3 8/15/2019 32.0 - 36.0   RDW (%)  11.8 11/25/2020  11.8 8/15/2019 11.0 - 15.0   Platelet  275 11/25/2020  308 8/15/2019 140 - 400   MPV (fL)  11.8 11/25/2020  11.3 8/15/2019 7.5 - 12.5       Please contact my practice at [phone] if you have any questions or concerns.     Sincerely,        Thony Salazar MD          What do your labs mean?  Below is a glossary of commonly ordered labs:  LDL   Bad Cholesterol   HDL   Good Cholesterol  AST/ALT   Liver Function   Cr/Creatinine   Kidney Function  Microalbumin   Kidney Function  BUN   Kidney Function  PSA   Prostate    TSH   Thyroid Hormone  HgbA1c   Diabetes Test   Hgb (Hemoglobin)   Red Blood Cells

## 2022-02-15 NOTE — NURSING NOTE
Diabetes Eye Testing Entered On:  6/17/2020 9:56 AM CDT    Performed On:  6/2/2020 9:55 AM CDT by Gurdeep Flores CMA               Diabetes Eye Testing   Retinopathy Present TR :   No   Dilated Retinal Exam Date TR :   6/2/2020 CDT   Gudreep Flores CMA - 6/17/2020 9:55 AM CDT

## 2022-02-15 NOTE — NURSING NOTE
Comprehensive Intake Entered On:  5/15/2020 1:26 PM CDT    Performed On:  5/15/2020 1:19 PM CDT by Jacquelin Palumbo               Summary   Chief Complaint :   c/o L lower back pain x 4 days; pain is getting.    Weight Measured :   260 lb(Converted to: 260 lb 0 oz, 117.93 kg)    Height Measured :   62 in(Converted to: 5 ft 2 in, 157.48 cm)    Body Mass Index :   47.55 kg/m2 (HI)    Body Surface Area :   2.27 m2   Systolic Blood Pressure :   142 mmHg (HI)    Diastolic Blood Pressure :   78 mmHg   Mean Arterial Pressure :   99 mmHg   Peripheral Pulse Rate :   70 bpm   BP Site :   Right arm   BP Method :   Manual   HR Method :   Electronic   Temperature Tympanic :   97.6 DegF(Converted to: 36.4 DegC)  (LOW)    Oxygen Saturation :   96 %   Jacquelin Palumbo - 5/15/2020 1:19 PM CDT   Health Status   Allergies Verified? :   Yes   Medication History Verified? :   Yes   Medical History Verified? :   Yes   Pre-Visit Planning Status :   N/A   Tobacco Use? :   Former smoker   Jacquelin Palumbo - 5/15/2020 1:19 PM CDT   Consents   Consent for Immunization Exchange :   Consent Granted   Consent for Immunizations to Providers :   Consent Granted   Jacquelin Palumbo - 5/15/2020 1:19 PM CDT   Meds / Allergies   (As Of: 5/15/2020 1:26:12 PM CDT)   Allergies (Active)   Neosporin  Estimated Onset Date:   <not entered> 2012 ; Reactions:   skin rash ; Created By:   Thony Salazar MD; Reaction Status:   Active ; Category:   Drug ; Substance:   Neosporin ; Type:   Allergy ; Severity:   Moderate ; Updated By:   Thony Salazar MD; Reviewed Date:   5/15/2020 1:23 PM CDT        Medication List   (As Of: 5/15/2020 1:26:13 PM CDT)   Prescription/Discharge Order    insulin detemir  :   insulin detemir ; Status:   Prescribed ; Ordered As Mnemonic:   Levemir FlexTouch 100 units/mL subcutaneous solution ; Simple Display Line:   See Instructions, 40 units sc QHS.....for DM II  E11.9, 15 mL, 3 Refill(s) ; Ordering Provider:   Thony Salazar MD;  Catalog Code:   insulin detemir ; Order Dt/Tm:   5/11/2020 9:05:09 AM CDT          insulin lispro  :   insulin lispro ; Status:   Prescribed ; Ordered As Mnemonic:   HumaLOG KwikPen 100 units/mL injectable solution ; Simple Display Line:   10 unit(s), subcutaneous, tidac, 2 syringes, 5 Refill(s) ; Ordering Provider:   Thony Salazar MD; Catalog Code:   insulin lispro ; Order Dt/Tm:   2/25/2020 9:59:59 AM CST          Miscellaneous Prescription  :   Miscellaneous Prescription ; Status:   Prescribed ; Ordered As Mnemonic:   Relion Pen Needles [id number] MIS ; Simple Display Line:   See Instructions, for DM E11.9 use with insulin injection QID, 120 EA, 5 Refill(s) ; Ordering Provider:   Thony Salazar MD; Catalog Code:   Miscellaneous Prescription ; Order Dt/Tm:   11/26/2019 11:20:13 AM CST          allopurinol  :   allopurinol ; Status:   Prescribed ; Ordered As Mnemonic:   allopurinol 100 mg oral tablet ; Simple Display Line:   100 mg, 1 tab(s), PO, daily, 90 tab(s), 1 Refill(s) ; Ordering Provider:   Thony Salazar MD; Catalog Code:   allopurinol ; Order Dt/Tm:   11/21/2019 10:50:15 AM CST          simvastatin  :   simvastatin ; Status:   Prescribed ; Ordered As Mnemonic:   simvastatin 40 mg oral tablet ; Simple Display Line:   40 mg, 1 tab(s), po, hs, 90 tab(s), 1 Refill(s) ; Ordering Provider:   Thony Salazar MD; Catalog Code:   simvastatin ; Order Dt/Tm:   11/21/2019 10:50:09 AM CST          oxybutynin  :   oxybutynin ; Status:   Completed ; Ordered As Mnemonic:   oxybutynin 5 mg oral tablet ; Simple Display Line:   5 mg, 1 tab(s), po, bid, 180 tab(s), 1 Refill(s) ; Ordering Provider:   Thony Salazar MD; Catalog Code:   oxybutynin ; Order Dt/Tm:   11/21/2019 10:50:02 AM CST          furosemide  :   furosemide ; Status:   Prescribed ; Ordered As Mnemonic:   furosemide 20 mg oral tablet ; Simple Display Line:   40 mg, 2 tab(s), po, daily, 180 tab(s), 1 Refill(s) ; Ordering Provider:   Thony Salazar MD; Catalog  Code:   furosemide ; Order Dt/Tm:   11/21/2019 10:49:55 AM CST          betamethasone topical  :   betamethasone topical ; Status:   Prescribed ; Ordered As Mnemonic:   betamethasone valerate 0.1% topical cream ; Simple Display Line:   1 adriel, TOP, BID, 45 g, 1 Refill(s) ; Ordering Provider:   Thony Salazar MD; Catalog Code:   betamethasone topical ; Order Dt/Tm:   11/21/2019 10:49:50 AM CST          loratadine-pseudoephedrine  :   loratadine-pseudoephedrine ; Status:   Prescribed ; Ordered As Mnemonic:   Alavert D-12 oral tablet, extended release ; Simple Display Line:   1 tab(s), Oral, q12 hrs, 180 tab(s), 1 Refill(s) ; Ordering Provider:   Thony Salazar MD; Catalog Code:   loratadine-pseudoephedrine ; Order Dt/Tm:   11/21/2019 10:49:43 AM CST          atenolol-chlorthalidone  :   atenolol-chlorthalidone ; Status:   Prescribed ; Ordered As Mnemonic:   atenolol-chlorthalidone 50 mg-25 mg oral tablet ; Simple Display Line:   1 tab(s), po, daily, 90 tab(s), 1 Refill(s) ; Ordering Provider:   Thony Salazar MD; Catalog Code:   atenolol-chlorthalidone ; Order Dt/Tm:   11/21/2019 10:49:34 AM CST          Miscellaneous Rx Supply  :   Miscellaneous Rx Supply ; Status:   Prescribed ; Ordered As Mnemonic:   CONTOUR NEXT TEST Strips ; Simple Display Line:   See Instructions, USE ONE STRIP TO CHECK GLUCOSE BID  DX: E11.9, 120 EA, 5 Refill(s) ; Ordering Provider:   Thony Salazar MD; Catalog Code:   Miscellaneous Rx Supply ; Order Dt/Tm:   10/8/2019 7:29:46 AM CDT          aspirin  :   aspirin ; Status:   Prescribed ; Ordered As Mnemonic:   Aspir 81 oral delayed release tablet ; Simple Display Line:   81 mg, 1 tab(s), PO, Daily, 100 tab(s), 0 Refill(s) ; Ordering Provider:   Thony Salazar MD; Catalog Code:   aspirin ; Order Dt/Tm:   3/26/2018 1:30:54 PM CDT          Miscellaneous Rx Supply  :   Miscellaneous Rx Supply ; Status:   Prescribed ; Ordered As Mnemonic:   Ronak Contour next test strips ; Simple Display Line:   See  Instructions, Testing BID, 200 EA, 1 Refill(s) ; Ordering Provider:   Thony Salazar MD; Catalog Code:   Miscellaneous Rx Supply ; Order Dt/Tm:   9/12/2016 11:07:30 AM CDT            ID Risk Screen   Recent Travel History :   No recent travel   Family Member Travel History :   No recent travel   Other Exposure to Infectious Disease :   Unknown   Lopez/Jacquelin HUDSON - 5/15/2020 1:19 PM CDT

## 2022-02-15 NOTE — TELEPHONE ENCOUNTER
"Entered by Grace Dumont CMA on February 16, 2021 2:28:20 PM CST  ---------------------  From: Grace Dumont CMA   To: [facility]    Sent: 2/16/2021 2:28:20 PM CST  Subject: Medication Management     ** Submitted: **  Order:Miscellaneous Prescription (Easy Comfort Pen Needles 31 gauge x 5/16\")  See Instructions  for DM E11.9 use with insulin injection FOUR TIMES DAILY  Qty:  120 EA        Days Supply:  5        Refills:  5          Substitutions Allowed     Route To Pharmacy - [facility]    Signed by Grace Dumont CMA  2/16/2021 8:28:00 PM UT    ** Not Approved:  **  Miscellaneous Prescription (Easy Comfort Pen Needles 31 gauge x 5/16\")  for DM E11.9 use with insulin injection FOUR TIMES DAILY  Qty:  120 EA        Days Supply:  5        Refills:  5          Substitutions Allowed     Route To Pharmacy - [facility]   Note from Pharmacy:  This prescription was filled on 2/16/2021. Any refills authorized will be placed on file.  Signed by Grace Dumont CMA            ** Submitted: **  Order:insulin lispro (HumaLOG KwikPen 100 units/mL injectable solution)  See Instructions  TAKE TEN UNITS SUBCUTANEOUSLY (UNDER THE SKIN) THREE TIMES DAILY BEFORE MEALS  Qty:  15 mL        Days Supply:  42        Refills:  0          Substitutions Allowed     Route To Pharmacy - [facility]    Signed by Grace Dumont CMA  2/16/2021 8:27:00 PM UT    ** Submitted: **  Complete:insulin lispro (HumaLOG KwikPen 100 units/mL injectable solution)   Signed by Grace Dumont CMA  2/16/2021 8:27:00 PM UT    ** Not Approved:  **  insulin lispro (Humalog KwikPen (U-100) Insulin 100 unit/mL subcutaneous)  TAKE TEN UNITS SUBCUTANEOUSLY (UNDER THE SKIN) THREE TIMES DAILY BEFORE MEALS  Qty:  15 mL        Days Supply:  42        Refills:  1          Substitutions Allowed     Route To Pharmacy - [facility]   Signed by Tim YANES, " Grace            ** Submitted: **  Order:insulin detemir (Levemir FlexTouch 100 units/mL subcutaneous solution)  See Instructions  TAKE 40 UNITS SUBCUTANEOUSLY (UNDER THE SKIN) ONCE DAILY AT BEDTIME  Qty:  15 mL        Refills:  0          Substitutions Allowed     Route To Pharmacy - [facility]    Signed by Grace Dumont CMA  2/16/2021 8:27:00 PM [facility]    ** Submitted: **  Complete:insulin detemir (Levemir FlexTouch 100 units/mL subcutaneous solution)   Signed by Grace Dumont CMA  2/16/2021 8:27:00 PM [facility]    ** Not Approved:  **  insulin detemir (Levemir FlexTouch U-100 Insulin 100 unit/mL (3 mL) subcutaneous pen)  TAKE 40 UNITS SUBCUTANEOUSLY (UNDER THE SKIN) ONCE DAILY AT BEDTIME  Qty:  15 mL        Days Supply:  36        Refills:  0          Substitutions Allowed     Route To Pharmacy - [facility]   Signed by Grace Dumont CMA            ------------------------------------------  From: [facility]  To: Thony Salazar MD  Sent: February 16, 2021 2:18:31 PM CST  Subject: Medication Management  Due: February 17, 2021 2:12:29 PM CST     ** On Hold Pending Signature **     Drug: insulin lispro (HumaLOG KwikPen 100 units/mL injectable solution), 10 units Subcutaneous tidac  Quantity: 15 mL  Days Supply: 0  Refills: 0  Substitutions Allowed  Notes from Pharmacy: Dispense 90 day supply     Dispensed Drug: insulin lispro (HumaLOG KwikPen 100 units/mL injectable solution), TAKE TEN UNITS SUBCUTANEOUSLY (UNDER THE SKIN) THREE TIMES DAILY BEFORE MEALS  Quantity: 15 mL  Days Supply: 42  Refills: 1  Substitutions Allowed  Notes from Pharmacy:     ** On Hold Pending Signature **     Drug: insulin detemir (Levemir FlexTouch 100 units/mL subcutaneous solution), 40 units sc QHS.....for DM II E11.9  Quantity: 15 mL  Days Supply: 0  Refills: 2  Substitutions Allowed  Notes from Pharmacy:     Dispensed Drug: insulin detemir (Levemir FlexTouch 100 units/mL  "subcutaneous solution), TAKE 40 UNITS SUBCUTANEOUSLY (UNDER THE SKIN) ONCE DAILY AT BEDTIME  Quantity: 15 mL  Days Supply: 36  Refills: 0  Substitutions Allowed  Notes from Pharmacy:     ** On Hold Pending Signature **     Drug: Relion Pen Needles [id number] MIS, for DM E11.9 use with insulin injection QID  Quantity: 120 EA  Days Supply: 0  Refills: 4  Substitutions Allowed  Notes from Pharmacy:     Dispensed Drug: Easy Comfort Pen Needles 31 gauge x 5/16\", for DM E11.9 use with insulin injection FOUR TIMES DAILY  Quantity: 120 EA  Days Supply: 5  Refills: 5  Substitutions Allowed  Notes from Pharmacy: This prescription was filled on 2/16/2021. Any refills authorized will be placed on file.  ------------------------------------------  "

## 2022-02-15 NOTE — LETTER
(Inserted Image. Unable to display)         November 11, 2020      BOO ESCOBAR  [address] [place] [phone]        Dear BOO,    You are due for medication check with Dr Salazar in the month of December.  Video/Telephone visits along with in person visits at the [facility] are available at this time.   Dr Salazar will refill your medication at that appointment.      Make sure that you schedule your appointment before you run out of medication to ensure you're not going without any medication.  No further refills will be given until you have an appointment.    You may call  [phone] to schedule your  visit with Dr Salazar           Sincerely,           JOAQUÍN Mar with Dr Salazar

## 2022-02-15 NOTE — TELEPHONE ENCOUNTER
Entered by Bryn Harman MD on June 22, 2021 7:43:14 AM CDT  ---------------------  From: Bryn Harman MD   To: [facility]    Sent: 6/22/2021 7:43:14 AM CDT  Subject: Medication Management     ** Submitted: **  Complete:insulin lispro (HumaLOG KwikPen 100 units/mL injectable solution)   Signed by Bryn Harman MD  6/22/2021 12:43:00 PM RUST    ** Submitted: **  Complete:atenolol-chlorthalidone (atenolol-chlorthalidone 50 mg-25 mg oral tablet)   Signed by Bryn Harman MD  6/22/2021 12:43:00 PM RUST    ** Approved with modifications: **  atenolol-chlorthalidone (atenolol 50 mg-chlorthalidone 25 mg tablet)  TAKE ONE TABLET BY MOUTH DAILY  Qty:  90 tab(s)        Days Supply:  90        Refills:  8          Substitutions Allowed     Route To Pharmacy - [facility]   Signed by Bryn Harman MD    ** Approved **  insulin lispro (Humalog KwikPen (U-100) Insulin 100 unit/mL subcutaneous)  TAKE TEN UNITS SUBCUTANEOUSLY (UNDER THE SKIN) THREE TIMES DAILY BEFORE MEALS  Qty:  15 mL        Days Supply:  42        Refills:  0          Substitutions Allowed     Route To Pharmacy - [facility]   Note to Pharmacy:  Needs appointment  Signed by Bryn Harman MD            ------------------------------------------  From: [facility]  To: Thony Salazar MD  Sent: June 21, 2021 11:37:24 AM CDT  Subject: Medication Management  Due: June 4, 2021 8:26:15 PM CDT     ** On Hold Pending Signature **     Drug: atenolol-chlorthalidone (atenolol-chlorthalidone 50 mg-25 mg oral tablet), 1 tab(s) Oral daily  Quantity: 90 tab(s)  Days Supply: 0  Refills: 0  Substitutions Allowed  Notes from Pharmacy: pt due for appt in Dec     Dispensed Drug: atenolol-chlorthalidone (atenolol-chlorthalidone 50 mg-25 mg oral tablet), TAKE ONE TABLET BY MOUTH DAILY  Quantity: 90 tab(s)  Days Supply: 90  Refills: 1  Substitutions Allowed  Notes from  Pharmacy:     ** On Hold Pending Signature **     Dispensed Drug: insulin lispro (HumaLOG KwikPen 100 units/mL injectable solution), TAKE TEN UNITS SUBCUTANEOUSLY (UNDER THE SKIN) THREE TIMES DAILY BEFORE MEALS  Quantity: 15 mL  Days Supply: 42  Refills: 0  Substitutions Allowed  Notes from Pharmacy:  ------------------------------------------

## 2022-02-15 NOTE — PROGRESS NOTES
Patient:   BOO ESCOBAR            MRN: [id number]            FIN: [id number]               Age:   71 years     Sex:  Female     :  1945   Associated Diagnoses:   Type 2 diabetes mellitus; HLD (Hyperlipidemia); Chronic Renal Disease, Stage III; Overactive bladder; Impaired circulation of left lower extremity   Author:   Carmelita Zamora      Visit Information      Date of Service: 2017 09:08 am  Performing Location: [facility]  Encounter#: [id number]      Primary Care Provider (PCP):  Thony Salazar MD    NPI# [id number]      Referring Provider:  Carmelita Zamora    NPI# [id number]      Chief Complaint   2017 9:15 AM CDT     DM check. Foot check pass - R , L         History of Present Illness   Confirmed symptoms and concerns with patient as presented in CC above. here for med check and A1c  SHe is now on humalog for DM, states it is going well although she continues to gain wt  She has not been taking lasix as orderd. She uses it 4 days per week when she stays home. The other days she goes out to play cards and run errands and she would spend every 15 min urinating if she took the lasix.   She uses the oxybutinin on the days she doesnt use the lasix  She continues to have swollen feet and hands. No SOB but states she has had cough and congestion she relates to a cold for over 1 month and feels she needs a different antibiotic (used cephalexin for skin and URI)  States she has never had legs wrapped by PT and would be willing to try that      Review of Systems   Constitutional:  No fever, No chills.    Ear/Nose/Mouth/Throat:  No ear pain, No nasal congestion, No sore throat.    Respiratory:  Cough, No shortness of breath, No wheezing.    Gastrointestinal:  No nausea, No vomiting, No diarrhea.             Health Status   Allergies:    Allergic Reactions (Selected)  Moderate  Neosporin (Skin rash)   Medications:  (Selected)   Prescriptions  Prescribed  Any brand Microfine 6  mm 31 g  Pen needles: Any brand Microfine 6 mm 31 g  Pen needles, See Instructions, Instructions: use 1 needle daily, Supply, # 3 box(es), 3 Refill(s), Type: Maintenance, Pharmacy: [facility], this is to replace rx for [facility] needles, use 1 needle daily  Aspir 81 oral enteric coated tablet: 1 tab(s) ( 81 mg ), PO, Daily, # 100 tab(s), 0 Refill(s), Type: Maintenance, OTC (Rx)  Azithromycin 5 Day Dose Pack 250 mg oral tablet: 500mg day 1, 250 mg day 2-5, PO, Daily, # 6 tab(s), 0 Refill(s), Type: Maintenance, Pharmacy: [facility], 500mg day 1, 250 mg day 2-5 po daily  Ronak Contour next test strips: Ronak Contour next test strips, See Instructions, Instructions: Testing BID, Supply, # 200 EA, 1 Refill(s), Type: Maintenance, Pharmacy: [facility], Testing BID  Humalog Pen 100 units/mL subcutaneous solution: ( 8 unit(s) ), Subcutaneous, TIDAC, # 15 mL, 5 Refill(s), Type: Maintenance, Pharmacy: [facility], 8 unit(s) subcutaneous tidac  Levemir FlexTouch 100 units/mL subcutaneous solution: See Instructions, Instructions: 36 units sc QHS, # 45 mL, 5 Refill(s), Type: Maintenance, Pharmacy: [facility], 36 units sc QHS  Loratadine-D 12 Hour oral tablet, extended release: 1 tab(s), po, q12 hrs, # 180 tab(s), 1 Refill(s), Type: Maintenance  allopurinol 100 mg oral tablet: 1 tab(s) ( 100 mg ), PO, daily, # 90 tab(s), 1 Refill(s), Type: Maintenance, Pharmacy: [facility], 1 tab(s) po daily  atenolol-chlorthalidone 50 mg-25 mg oral tablet: 1 tab(s), po, daily, # 90 tab(s), 1 Refill(s), Type: Maintenance, Pharmacy: [facility], 1 tab(s) po daily  betamethasone valerate 0.1% topical cream: 1 adriel, TOP, BID, # 45 g, 0 Refill(s), Type: Maintenance, Pharmacy: [facility], 1 adriel top bid  cholecalciferol 2000 intl units oral tablet: 1 tab(s) ( 2,000 International Unit ), po, daily, # 30 tab(s), 5 Refill(s), Type: Maintenance, Pharmacy: [facility], 1 tab(s) po daily  furosemide 20 mg oral tablet: 1 tab(s) ( 20 mg ), po, daily, # 90 tab(s), 1 Refill(s), Type: Maintenance, Pharmacy: [facility], 1 tab(s) po daily  lisinopril 10 mg oral tablet: 1 tab(s) ( 10 mg ), PO, Daily, # 90 tab(s), 1 Refill(s), Type: Maintenance, Pharmacy: [facility], 1 tab(s) po daily  oxybutynin 5 mg oral tablet: 1 tab(s) ( 5 mg ), po, bid, # 180 tab(s), 1 Refill(s), Type: Maintenance, Pharmacy: [facility], 1 tab(s) po bid  simvastatin 40 mg oral tablet: 1 tab(s) ( 40 mg ), po, hs, # 90 tab(s), 0 Refill(s), Type: Maintenance, Pharmacy: [facility], Pt due for appt for additional refills, 1 tab(s) po hs   Problem list:    All Problems  HTN (hypertension) / SNOMED CT 8763879283 / Confirmed  Gout / SNOMED CT 087041355 / Confirmed  Basal cell carcinoma / ICD-9-.91 / Confirmed  Type 2 diabetes mellitus / SNOMED CT 263316524 / Confirmed  Obesity / ICD-9-.00 / Confirmed  HLD (Hyperlipidemia) / SNOMED CT 487103043 / Confirmed  Allergic Rhinitis / ICD-9- / Confirmed  Asthma / ICD-9-.90 / Confirmed  Chronic Renal Disease, Stage III / ICD-9-.3 / Confirmed  Overactive bladder / ICD-9-.51 / Confirmed  Menopause / ICD-9-.2 / Confirmed  OA (Osteoarthritis) / ICD-9-.90 / Confirmed  right knee  Incontinence / SNOMED CT 88848349 / Confirmed  urge  Inactive: Tobacco abuse / ICD-9-.1  Former tobacco user.  Resolved: *Hospitalized @ [facility] - Chest pain  Canceled: Hyperlipemia / ICD-9-.4      Histories   Past Medical History:    Active  Obesity (278.00)  Type 2 diabetes mellitus (688800607)  HTN (hypertension) (3206989017)  OA (Osteoarthritis) (715.90)  Comments:  2/1/2010 CST 2:32 PM CST - Shackleton LPN, Milena  right knee  Asthma (493.90)  Chronic Renal Disease, Stage III (585.3)  Incontinence (40126184)  Comments:  2/1/2010 CST 2:33 PM CST - Shackleton LPN, Milena  urge  Overactive  bladder (596.51)  Menopause (627.2)  Allergic Rhinitis (477)  Resolved  *Hospitalized @ [facility] - Chest pain:  Resolved.   Family History:    Cancer  Grandmother (M)  Emphysema of lung  Father     Procedure history:    Anal sphincterotomy (SNOMED CT 342260396) on 2005 at 60 Years.  Comments:  2013 1:31 PM - Zohra Terrazas MD  Carpal tunnel release (SNOMED CT 302195265) in  at 43 Years.  Comments:  2010 10:57 AM - Milena Lowry LPN  bilateral   section (SNOMED CT 52899467) in  at 34 Years.  Appendectomy (SNOMED CT 545325365) in  at 7 Years.  Tonsillectomy (SNOMED CT 008135817) in 1947 at 13 Months.   Social History:        Alcohol Assessment: Denies Alcohol Use            Never      Tobacco Assessment: Past            Past      Substance Abuse Assessment            Never      Employment and Education Assessment            Retired      Home and Environment Assessment            Marital status: .  Lives with Self.  1 children.  Living situation: Home/Independent.      Exercise and Physical Activity Assessment: Does not exercise            Exercise frequency: No regular exercise..        Physical Examination   Vital Signs   2017 9:15 AM CDT Temperature Tympanic 96.6 DegF  LOW    Peripheral Pulse Rate 70 bpm    Systolic Blood Pressure 120 mmHg    Diastolic Blood Pressure 68 mmHg    Mean Arterial Pressure 85 mmHg      Measurements from flowsheet : Measurements   2017 9:15 AM CDT Height Measured - Standard 62 in    Weight Measured - Standard 269.6 lb    BSA 2.31 m2    Body Mass Index 49.31 kg/m2      General:  Alert and oriented, No acute distress.    Eye:  Normal conjunctiva.    HENT:  Tympanic membranes are clear, Normal hearing, Oral mucosa is moist, No pharyngeal erythema, No sinus tenderness.    Neck:  Supple, Non-tender, No lymphadenopathy.    Respiratory:  Lungs are clear to auscultation, Respirations are non-labored, Breath sounds are  equal, Symmetrical chest wall expansion.    Cardiovascular:  Normal rate, Regular rhythm, No murmur, normal monofillament testing, she has significant LE swelling and edema in her feet, she shows me an anterior area on the right shin where it is getting erythematous but no weeping or open sores. STates this is fairly new.    Integumentary:  Warm, Dry, Pink, No rash.    Neurologic:  Alert, Oriented.    Psychiatric:  Cooperative.       Impression and Plan   Diagnosis     Type 2 diabetes mellitus (TSU61-PO E11.9).     HLD (Hyperlipidemia) (STV00-RK E78.0).     Chronic Renal Disease, Stage III (EGR88-NL N18.3).     Overactive bladder (VSV39-PZ N31.8).     Impaired circulation of left lower extremity (TQV80-KH I87.2).     Patient Instructions:       Counseled: Patient, Regarding diagnosis, Regarding treatment, Regarding medications, Verbalized understanding, advised that if she is only using lasix 3-4 times per week then she should double the dose she takes on those days to help with the swelling in her legs. SHe is agreeable to plan.    Orders     Orders (Selected)   Outpatient Orders  Ordered  Physical Therapy (Request): Instructions: To be sent to [facility] and rehab  Please contact pt to set up appt, Left leg pain  Return to Clinic (Request): RFV: 6 month med ck with fasting labwork 1 week prior, Return in Dec  2017  Prescriptions  Prescribed  Azithromycin 5 Day Dose Pack 250 mg oral tablet: 500mg day 1, 250 mg day 2-5, PO, Daily, # 6 tab(s), 0 Refill(s), Type: Maintenance, Pharmacy: [facility], 500mg day 1, 250 mg day 2-5 po daily  Humalog Pen 100 units/mL subcutaneous solution: ( 8 unit(s) ), Subcutaneous, TIDAC, # 15 mL, 5 Refill(s), Type: Maintenance, Pharmacy: [facility], 8 unit(s) subcutaneous tidac  Levemir FlexTouch 100 units/mL subcutaneous solution: See Instructions, Instructions: 36 units sc QHS, # 45 mL, 5 Refill(s), Type: Maintenance, Pharmacy: [facility], 36  units sc QHS  allopurinol 100 mg oral tablet: 1 tab(s) ( 100 mg ), PO, daily, # 90 tab(s), 1 Refill(s), Type: Maintenance, Pharmacy: [facility], 1 tab(s) po daily  atenolol-chlorthalidone 50 mg-25 mg oral tablet: 1 tab(s), po, daily, # 90 tab(s), 1 Refill(s), Type: Maintenance, Pharmacy: [facility], 1 tab(s) po daily  lisinopril 10 mg oral tablet: 1 tab(s) ( 10 mg ), PO, Daily, # 90 tab(s), 1 Refill(s), Type: Maintenance, Pharmacy: [facility], 1 tab(s) po daily  simvastatin 40 mg oral tablet: 1 tab(s) ( 40 mg ), po, hs, # 90 tab(s), 1 Refill(s), Type: Maintenance, Pharmacy: [facility], 1 tab(s) po hs.         lab was unable to draw a1c today, will attempt again in 6 months.

## 2022-02-15 NOTE — LETTER
(Inserted Image. Unable to display)     August 02, 2019      BOO ESCOBAR  [address] [place] [address] [address] [place] [id number]          Dear BOO,      Thank you for selecting [facility] ([facility]viously [facility]) for your healthcare needs.      Our records indicate you are due for the following services:     Non-Fasting Labs.    If you had your labs done at another facility or with [facility] at [facility], please bring in a copy of the results to your next visit, mail a copy, or drop off a copy of your results to your Healthcare Provider.      To schedule an appointment or if you have further questions, please contact your primary clinic:   [facility]       [phone]   [facility]       [phone]              [facility]     [phone]      Powered by [employer]    Sincerely,    Thony Salazar MD

## 2022-02-15 NOTE — PROGRESS NOTES
Patient:   BOO ESCOBAR            MRN: [id number]            FIN: [id number]               Age:   71 years     Sex:  Female     :  1945   Associated Diagnoses:   Venous stasis dermatitis; Venous ulcer of right leg   Author:   Thony Salazar MD      Impression and Plan   Diagnosis     Venous stasis dermatitis (GNF08-TT I87.2).     Venous ulcer of right leg (RQV84-WZ L97.919).     Plan:  daily dressing change with Silvadine cream and ACE wrap daily.  Follow up in two weeks.    Orders     Orders   Charges (Evaluation and Management):  69330 office outpatient visit 15 minutes (Charge) (Order): Quantity: 1, Venous ulcer of right leg  Venous stasis dermatitis.     Orders (Selected)   Prescriptions  Prescribed  silver sulfADIAZINE 1% topical cream: 1 adriel, TOP, daily, # 50 g, 0 Refill(s), Type: Maintenance, Pharmacy: [facility], 1 adriel top daily.        Visit Information      Date of Service: 2017 09:39 am  Performing Location: [facility]  Encounter#: [id number]      Primary Care Provider (PCP):  Thony Salazar MD    NPI# [id number]   Visit type:  New symptom.    Accompanied by:  No one.    Source of history:  Self.    History limitation:  None.       Chief Complaint   2017 9:42 AM CDT    Pt here for swollen leg with blisters        History of Present Illness             The patient presents with venous stasis ulcer of the RLE.  Exacerbating factors consist of dry winter air.  Relieving factors consist of lotion.  Associated symptoms consist of change in skin color.        Review of Systems   Constitutional:  Negative.    Eye:  Negative.    Ear/Nose/Mouth/Throat:  Negative.    Respiratory:  Cough.    Cardiovascular:  Negative.    Gastrointestinal:  Negative.    Genitourinary:  Negative.    Hematology/Lymphatics:  Negative.    Endocrine:  Negative.    Immunologic:  Negative.    Musculoskeletal:  Negative.    Integumentary:  Negative except as documented in history of present  illness.    Neurologic:  Negative.    Psychiatric:  Negative.    All other systems reviewed and negative      Health Status   Allergies:    Allergic Reactions (Selected)  Moderate  Neosporin (Skin rash)   Medications:  (Selected)   Prescriptions  Prescribed  Any brand Microfine 6 mm 31 g  Pen needles: Any brand Microfine 6 mm 31 g  Pen needles, See Instructions, Instructions: use 1 needle daily, Supply, # 3 box(es), 3 Refill(s), Type: Maintenance, Pharmacy: [facility], this is to replace rx for [facility] needles, use 1 needle daily  Aspir 81 oral enteric coated tablet: 1 tab(s) ( 81 mg ), PO, Daily, # 100 tab(s), 0 Refill(s), Type: Maintenance, OTC (Rx)  Ronak Contour next test strips: Ronak Contour next test strips, See Instructions, Instructions: Testing BID, Supply, # 200 EA, 1 Refill(s), Type: Maintenance, Pharmacy: [facility], Testing BID  CONTOUR NEXT        MATT: See Instructions, Instructions: USE ONE STRIP TO CHECK GLUCOSE TWICE DAILY, # 200 unknown unit, Type: Soft Stop, Pharmacy: [facility], USE ONE STRIP TO CHECK GLUCOSE TWICE DAILY  Humalog Pen 100 units/mL subcutaneous solution: ( 8 unit(s) ), Subcutaneous, TIDAC, # 15 mL, 5 Refill(s), Type: Maintenance, Pharmacy: [facility], 8 unit(s) subcutaneous tidac  Levemir FlexTouch 100 units/mL subcutaneous solution: See Instructions, Instructions: 36 units sc QHS, # 45 mL, 5 Refill(s), Type: Maintenance, Pharmacy: [facility], 36 units sc QHS  Loratadine-D 12 Hour oral tablet, extended release: 1 tab(s), po, q12 hrs, # 180 tab(s), 1 Refill(s), Type: Maintenance  Relion Pen Needles 31 G 6 mm: Relion Pen Needles 31 G 6 mm, See Instructions, Instructions: Use with insulin injection QID, Supply, # 120 EA, 5 Refill(s), Type: Maintenance, Pharmacy: [facility], Use with insulin injection QID  allopurinol 100 mg oral tablet: 1 tab(s) ( 100 mg ), PO, daily, # 90 tab(s), 1 Refill(s), Type:  Maintenance, Pharmacy: [facility], 1 tab(s) po daily  atenolol-chlorthalidone 50 mg-25 mg oral tablet: 1 tab(s), po, daily, # 90 tab(s), 1 Refill(s), Type: Maintenance, Pharmacy: [facility], 1 tab(s) po daily  betamethasone valerate 0.1% topical cream: 1 adriel, TOP, BID, # 45 g, 0 Refill(s), Type: Maintenance, Pharmacy: [facility], 1 adriel top bid  cholecalciferol 2000 intl units oral tablet: 1 tab(s) ( 2,000 International Unit ), po, daily, # 30 tab(s), 5 Refill(s), Type: Maintenance, Pharmacy: [facility], 1 tab(s) po daily  furosemide 20 mg oral tablet: 1 tab(s) ( 20 mg ), po, daily, # 90 tab(s), 1 Refill(s), Type: Maintenance, Pharmacy: [facility], 1 tab(s) po daily  lisinopril 10 mg oral tablet: 1 tab(s) ( 10 mg ), PO, Daily, # 90 tab(s), 1 Refill(s), Type: Maintenance, Pharmacy: [facility], 1 tab(s) po daily  oxybutynin 5 mg oral tablet: 1 tab(s) ( 5 mg ), po, bid, # 180 tab(s), 1 Refill(s), Type: Maintenance, Pharmacy: [facility], 1 tab(s) po bid  silver sulfADIAZINE 1% topical cream: 1 adriel, TOP, daily, # 50 g, 0 Refill(s), Type: Maintenance, Pharmacy: [facility], 1 adriel top daily  simvastatin 40 mg oral tablet: 1 tab(s) ( 40 mg ), po, hs, # 90 tab(s), 1 Refill(s), Type: Maintenance, Pharmacy: [facility], 1 tab(s) po hs   Problem list:    All Problems  Allergic Rhinitis / ICD-9- / Confirmed  Asthma / ICD-9-.90 / Confirmed  Basal cell carcinoma / ICD-9-.91 / Confirmed  Chronic Renal Disease, Stage III / ICD-9-.3 / Confirmed  Gout / SNOMED CT 385776179 / Confirmed  HLD (Hyperlipidemia) / SNOMED CT 375392793 / Confirmed  HTN (hypertension) / SNOMED CT 9555094413 / Confirmed  Impaired circulation of left lower extremity / SNOMED CT 69824001 / Confirmed  Incontinence / SNOMED CT 61638629 / Confirmed  Menopause / ICD-9-.2 / Confirmed  OA (Osteoarthritis) / ICD-9-.90 /  Confirmed  Obesity / ICD-9-.00 / Confirmed  Overactive bladder / ICD-9-.51 / Confirmed  Type 2 diabetes mellitus / SNOMED CT 245422922 / Confirmed  Inactive: Tobacco abuse / ICD-9-.1  Resolved: *Hospitalized @ [facility] Chest pain  Canceled: Hyperlipemia / ICD-9-.4      Histories   Past Medical History:    Active  Obesity (278.00)  Type 2 diabetes mellitus (300478744)  HTN (hypertension) (9589773803)  OA (Osteoarthritis) (715.90)  Comments:  2010 CST 2:32 PM CST - Milena Lowry LPN  right knee  Asthma (493.90)  Chronic Renal Disease, Stage III (585.3)  Incontinence (53013979)  Comments:  2010 CST 2:33 PM JIHAN - Shackleton LPN, Milena  urge  Overactive bladder (596.51)  Menopause (627.2)  Allergic Rhinitis (477)  Resolved  *Hospitalized @ [facility] - Chest pain:  Resolved.   Family History:    Cancer  Grandmother (M)  Emphysema of lung  Father     Procedure history:    Anal sphincterotomy (SNOMED CT 674801816) on 2005 at 60 Years.  Comments:  2013 1:31 PM - Zohra Terrazas MD  Carpal tunnel release (SNOMED CT 137757327) in  at 43 Years.  Comments:  2010 10:57 AM - Milena Lowry LPN  bilateral   section (SNOMED CT 45160251) in  at 34 Years.  Appendectomy (SNOMED CT 094810185) in 1952 at 7 Years.  Tonsillectomy (SNOMED CT 250551945) in 1947 at 13 Months.   Social History:        Alcohol Assessment: Denies Alcohol Use            Never      Tobacco Assessment: Past            Past      Substance Abuse Assessment            Never      Employment and Education Assessment            Retired      Home and Environment Assessment            Marital status: .  Lives with Self.  1 children.  Living situation: Home/Independent.      Exercise and Physical Activity Assessment: Does not exercise            Exercise frequency: No regular exercise..        Physical Examination   Vital Signs   2017 9:42 AM CDT Peripheral Pulse  Rate 72 bpm    Pulse Site Radial artery    Systolic Blood Pressure 128 mmHg    Diastolic Blood Pressure 72 mmHg    Mean Arterial Pressure 91 mmHg    BP Site Right arm      Measurements from flowsheet : Measurements   6/14/2017 9:42 AM CDT    Weight Measured - Standard                269.6 lb     General:  Alert and oriented X 3, No acute distress, Warm, Pink, Intact.         Appearance: Within normal limits, Well nourished, Calm.         Hydration: Within normal limits.         Psych: Within normal limits, Appropriate mood and affect, Cooperative, Normal judgment.    Integumentary:  1.5 cm diameter stage one venous stasis ulcer of the right lower shin.  No signs or symptoms of infection..

## 2022-02-15 NOTE — NURSING NOTE
Depression Screening Entered On:  11/25/2020 2:42 PM CST    Performed On:  11/25/2020 2:42 PM CST by Zohra Gibbs MA               Depression Screening   Little Interest - Pleasure in Activities :   Not at all   Feeling Down, Depressed, Hopeless :   Not at all   Initial Depression Screen Score :   0 Score   Poor Appetite or Overeating :   Several days   Trouble Falling or Staying Asleep :   More than half the days   Feeling Tired or Little Energy :   More than half the days   Feeling Bad About Yourself :   Several days   Trouble Concentrating :   Not at all   Moving or Speaking Slowly :   Not at all   Thoughts Better Off Dead or Hurting Self :   Not at all   Detailed Depression Screen Score :   6    Total Depression Screen Score :   6    Zohra Gibbs MA - 11/25/2020 2:42 PM CST

## 2022-02-15 NOTE — PROGRESS NOTES
Patient:   BOO ESCOBAR            MRN: [id number]            FIN: [id number]               Age:   73 years     Sex:  Female     :  1945   Associated Diagnoses:   HTN (hypertension); HLD (Hyperlipidemia); Type 2 diabetes mellitus   Author:   Thony Salazar MD      Visit Information      Date of Service: 10/01/2018 01:29 pm  Performing Location: [facility]  Encounter#: [id number]      Primary Care Provider (PCP):  Thony Salazar MD    NPI# [id number]      Referring Provider:  Thony Salazar MD# [id number]   Visit type:  Scheduled follow-up.    Accompanied by:  No one.    Source of history:  Self.    History limitation:  None.       Impression and Plan   Diagnosis     HTN (hypertension) ([id number] I10).     Course:  Well controlled.    Orders     Orders   Charges (Evaluation and Management):  28734 office outpatient visit 25 minutes (Charge) (Order): Quantity: 1, HTN (hypertension)  Type 2 diabetes mellitus  HLD (Hyperlipidemia).     Orders (Selected)   Prescriptions  Prescribed  atenolol-chlorthalidone 50 mg-25 mg oral tablet: 1 tab(s), po, daily, # 90 tab(s), 1 Refill(s), Type: Maintenance, Pharmacy: [facility] [place], [place], 1 tab(s) Oral daily  furosemide 20 mg oral tablet: = 1 tab(s) ( 20 mg ), po, daily, # 90 tab(s), 1 Refill(s), Type: Maintenance, Pharmacy: [facility] [place], 1 tab(s) Oral daily  lisinopril 10 mg oral tablet: = 1 tab(s) ( 10 mg ), PO, Daily, # 90 tab(s), 1 Refill(s), Type: Maintenance, Pharmacy: [facility] [place], 1 tab(s) Oral daily.     Diagnosis     HLD (Hyperlipidemia) (FEJ01-RD E78.00).     Course:  Progressing as expected.    Orders     Orders (Selected)   Prescriptions  Prescribed  simvastatin 40 mg oral tablet: = 1 tab(s) ( 40 mg ), po, hs, # 90 tab(s), 1 Refill(s), Type: Maintenance, Pharmacy: [facility] [place], 1 tab(s) Oral hs.     Diagnosis     Type 2 diabetes mellitus (IWC27-JO E11.9).      Course:  Well controlled.    Orders     Orders (Selected)   Prescriptions  Prescribed  HumaLOG KwikPen 100 units/mL injectable solution: ( 4 unit(s) ), subcutaneous, tidac, # 5 syringes, 3 Refill(s), Type: Maintenance, Pharmacy: [facility] [place], Dispense 90 day supply, 4 unit(s) Subcutaneous tidac  Levemir FlexTouch 100 units/mL subcutaneous solution: See Instructions, Instructions: 36 units sc QHS, # 5 syringes, 3 Refill(s), Type: Maintenance, Pharmacy: [facility] [place], 36 units sc QHS.        Chief Complaint   Chief complaint discussed and confirmed correct.     10/1/2018 1:35 PM CDT    Pt here for DM med ck        History of Present Illness             The patient presents for follow-up evaluation of diabetes.  The quality of the patient's diabetes is described as being unchanged from previous visit.  Relieving factors consist of diet changes, increased activity and medication.  Associated symptoms consist of none.  Medical encounters: none.  Compliance problems: none.  Glucose results: within target range.  Hemoglobin A1c results: > 6% and within target range.  Lifestyle modification: weight reduction, diet, increased physical activity.  Medications:.  Additional pertinent history: last podiatric foot exam: 10/1/2018 and eye exam recommended.  Notes.               The patient presents for follow-up evaluation of hypertension.  The quality of hypertension symptom(s) since the patient's last visit is described as being unchanged.  The severity of the hypertension symptom(s) since the last visit is moderate.  Since the patient's last visit, the timing/course of hypertension symptom(s) is constant.  Exacerbating factors consist of none.  Relieving factors consist of medication.  Associated symptoms consist of none.  Prior treatment consists of lifestyle modification (weight reduction, dietary sodium restriction, increased physical activity, adoption of DASH eating plan).   Medical encounters: none.  Compliance problems: none.        Interval History   Cholesterol Management   Total cholesterol results < 200 mg/dL (optimal).  LDL cholesterol results < 100 mg/dL (optimal).  HDL cholesterol results >40mg/dl.  Triglyceride results < 150 mg/dL (normal).  The course is progressing as expected.  The effect on daily activities is no change in activity level and no change in eating habits.  Associated symptoms characterized by no fatigue, chest pain, joint pain, muscle weakness or myalgias.        Review of Systems   /   Constitutional:  Negative.    Eye:  Negative.    Ear/Nose/Mouth/Throat:  Negative.    Respiratory:  Negative.    Cardiovascular:  Negative.    Gastrointestinal:  Negative.    Genitourinary:  Negative.    Hematology/Lymphatics:  Negative.    Endocrine:  Negative.    Immunologic:  Negative.    Musculoskeletal:  Back pain.    Integumentary   Neurologic:  Negative.    Psychiatric:  Negative.    All other systems reviewed and negative      Health Status   Allergies:    Allergic Reactions (Selected)  Moderate  Neosporin (Skin rash)   Medications:  (Selected)   Prescriptions  Prescribed  Aspir 81 oral delayed release tablet: 1 tab(s) ( 81 mg ), PO, Daily, # 100 tab(s), 0 Refill(s), Type: Maintenance, Pharmacy: [facility] [place], 1 tab(s) po daily  Ronak Contour next test strips: Ronak Contour next test strips, See Instructions, Instructions: Testing BID, Supply, # 200 EA, 1 Refill(s), Type: Maintenance, Pharmacy: [id number], Testing BID  CONTOUR NEXT TEST Strips: CONTOUR NEXT TEST Strips, See Instructions, Instructions: USE ONE STRIP TO CHECK GLUCOSE TWICE DAILY  DX: E11.9, Supply, # 180 EA, 1 Refill(s), Type: Soft Stop, USE ONE STRIP TO CHECK GLUCOSE TWICE DAILY; DX: E11.9  HumaLOG KwikPen 100 units/mL injectable solution: ( 4 unit(s) ), subcutaneous, tidac, # 5 syringes, 3 Refill(s), Type: Maintenance, Pharmacy: [facility] [place],  Dispense 90 day supply, 4 unit(s) Subcutaneous tidac  Levemir FlexTouch 100 units/mL subcutaneous solution: See Instructions, Instructions: 36 units sc QHS, # 5 syringes, 3 Refill(s), Type: Maintenance, Pharmacy: [facility] [place], [place], 36 units sc QHS  Loratadine-D 12 Hour oral tablet, extended release: 1 tab(s), po, q12 hrs, # 180 tab(s), 1 Refill(s), Type: Maintenance, Pharmacy: [facility] [place], 1 tab(s) po q12 hrs  Relion Pen Needles [id number] MIS: Relion Pen Needles [id number] MIS, See Instructions, Instructions: for DM E11.9 use with insulin injection BID, Supply, # 120 EA, 1 Refill(s), Type: Maintenance, Pharmacy: [facility] [place], for DM E11.9 use with insulin injection BID  allopurinol 100 mg oral tablet: = 1 tab(s) ( 100 mg ), PO, daily, # 90 tab(s), 1 Refill(s), Type: Maintenance, Pharmacy: [facility] [place], 1 tab(s) Oral daily  atenolol-chlorthalidone 50 mg-25 mg oral tablet: 1 tab(s), po, daily, # 90 tab(s), 1 Refill(s), Type: Maintenance, Pharmacy: [facility] [place], 1 tab(s) Oral daily  betamethasone valerate 0.1% topical cream: 1 adriel, TOP, BID, # 45 g, 1 Refill(s), Type: Maintenance, Pharmacy: [facility] [place], 1 adriel top bid  furosemide 20 mg oral tablet: = 1 tab(s) ( 20 mg ), po, daily, # 90 tab(s), 1 Refill(s), Type: Maintenance, Pharmacy: [facility] [place], 1 tab(s) Oral daily  lisinopril 10 mg oral tablet: = 1 tab(s) ( 10 mg ), PO, Daily, # 90 tab(s), 1 Refill(s), Type: Maintenance, Pharmacy: [facility] [place], 1 tab(s) Oral daily  oxybutynin 5 mg oral tablet: = 1 tab(s) ( 5 mg ), po, bid, # 180 tab(s), 1 Refill(s), Type: Maintenance, Pharmacy: [facility], 1 tab(s) Oral bid  simvastatin 40 mg oral tablet: = 1 tab(s) ( 40 mg ), po, hs, # 90 tab(s), 1 Refill(s), Type: Maintenance, Pharmacy: [facility], 1 tab(s)  Oral hs   Problem list:    All Problems  Allergic Rhinitis / ICD-9- / Confirmed  Asthma / ICD-9-.90 / Confirmed  Basal cell carcinoma / ICD-9-.91 / Confirmed  Chronic Renal Disease, Stage III / ICD-9-.3 / Confirmed  Current use of insulin / SNOMED CT 4145902394 / Confirmed  Gout / SNOMED CT 959091836 / Confirmed  HLD (Hyperlipidemia) / SNOMED CT 175770537 / Confirmed  HTN (hypertension) / SNOMED CT 3915984662 / Confirmed  Impaired circulation of left lower extremity / SNOMED CT 47165117 / Confirmed  Incontinence / SNOMED CT 40871691 / Confirmed  Menopause / ICD-9-.2 / Confirmed  OA (Osteoarthritis) / ICD-9-.90 / Confirmed  Obesity / ICD-9-.00 / Confirmed  Overactive bladder / ICD-9-.51 / Confirmed  Type 2 diabetes mellitus / SNOMED CT 213116113 / Confirmed  Inactive: Tobacco abuse / ICD-9-.1  Resolved: *Hospitalized @ [facility] Chest pain  Canceled: Hyperlipemia / ICD-9-.4      Histories   Past Medical History:    Active  Obesity (278.00)  Type 2 diabetes mellitus (893027843)  HTN (hypertension) (0571630762)  OA (Osteoarthritis) (715.90)  Comments:  2010 CST 2:32 PM CST - Milena Lowry LPN  right knee  Asthma (493.90)  Chronic Renal Disease, Stage III (585.3)  Incontinence (22700608)  Comments:  2010 CST 2:33 PM JIHAN - Shackleton LPN, Milena  urge  Overactive bladder (596.51)  Menopause (627.2)  Allergic Rhinitis (477)  Resolved  *Hospitalized @ [facility] - Chest pain:  Resolved.   Family History:    Cancer  Grandmother (M)  Emphysema of lung  Father     Procedure history:    Anal sphincterotomy (SNOMED CT 346036664) on 2005 at 60 Years.  Comments:  2013 1:31 PM - Zohra Terrazas MD  Carpal tunnel release (SNOMED CT 535762583) in  at 43 Years.  Comments:  2010 10:57 AM - Milena Lowry LPN  bilateral   section (SNOMED CT 54366206) in  at 34 Years.  Appendectomy (SNOMED CT 832586668) in  1952 at 7 Years.  Tonsillectomy (SNOMED CT 271196886) in 1947 at 13 Months.   Social History:        Alcohol Assessment: Denies Alcohol Use            Never      Tobacco Assessment: Past            Past      Substance Abuse Assessment            Never      Employment and Education Assessment            Retired      Home and Environment Assessment            Marital status: .  Lives with Self.  1 children.  Living situation: Home/Independent.      Exercise and Physical Activity Assessment: Does not exercise            Exercise frequency: No regular exercise..        Physical Examination   Vital signs reviewed  and within acceptable limits    Vital Signs   10/1/2018 1:35 PM CDT Peripheral Pulse Rate 65 bpm    Systolic Blood Pressure 132 mmHg  HI    Diastolic Blood Pressure 80 mmHg    Mean Arterial Pressure 97 mmHg    BP Site Left arm    Oxygen Saturation 97 %      Measurements from flowsheet : Measurements   10/1/2018 1:35 PM CDT Height Measured - Standard 62 in    Weight Measured - Standard 267.2 lb    BSA 2.3 m2    Body Mass Index 48.87 kg/m2  HI      General:  Alert and oriented, No acute distress, elevated BMI.    HENT:  Normocephalic.    Neck:  Supple, No lymphadenopathy, No thyromegaly.    Respiratory:  Lungs are clear to auscultation, Respirations are non-labored, Breath sounds are equal, Symmetrical chest wall expansion.    Cardiovascular:  Normal rate, Regular rhythm, No murmur, No gallop, Good pulses equal in all extremities, Normal peripheral perfusion, No edema.    Gastrointestinal:  Soft, Non-tender, Non-distended, Normal bowel sounds, No organomegaly.    Musculoskeletal:  Normal range of motion, No swelling, No deformity.         Upper extremity exam: Shoulder ( Right, No deformity, No erythema, No ecchymosis, No swelling, Tenderness, No wound, No crepitus, No numbness, Strength  5 /5, Range of motion ( Diminished ) ).    Integumentary:  Warm, Dry, Pink, No foot ulcers or skin lesions..    Feet:   Normal by visual exam, Sensation intact, By monofilament exam.    Neurologic:  Alert, Oriented, Normal sensory, Normal motor function, No focal deficits.    Psychiatric:  Cooperative, Appropriate mood & affect.       Health Maintenance   Immunizations      Review / Management   Results review

## 2022-02-15 NOTE — TELEPHONE ENCOUNTER
---------------------  From: Thelma Lilly CMA   To: Thony Salazar MD;     Sent: 11/25/2020 3:02:20 PM CST  Subject: General Message     HI Dr. Salazar  The Alavert-D prescription you sent in today for Wendy did not go through.  This is because [place] considers pseudoephedrine a controlled substance.  Please have your CSS call it to the pharmacy.  Thanks   Thelma

## 2022-04-22 NOTE — NURSING NOTE
Comprehensive Intake Entered On:  4/24/2019 2:00 PM CDT    Performed On:  4/24/2019 1:55 PM CDT by Grace Dumont CMA               Summary   Chief Complaint :   Pt here for med ck   Weight Measured :   268 lb(Converted to: 268 lb 0 oz, 121.56 kg)    Height Measured :   62 in(Converted to: 5 ft 2 in, 157.48 cm)    Body Mass Index :   49.01 kg/m2 (HI)    Body Surface Area :   2.3 m2   Systolic Blood Pressure :   130 mmHg   Diastolic Blood Pressure :   66 mmHg   Mean Arterial Pressure :   87 mmHg   Peripheral Pulse Rate :   71 bpm   Oxygen Saturation :   95 %   Grace Dumont CMA - 4/24/2019 1:55 PM CDT   Health Status   Allergies Verified? :   Yes   Medication History Verified? :   Yes   Medical History Verified? :   Yes   Pre-Visit Planning Status :   Completed   Tobacco Use? :   Former smoker   Grace Dumont CMA - 4/24/2019 1:55 PM CDT   Consents   Consent for Immunization Exchange :   Consent Granted   Consent for Immunizations to Providers :   Consent Granted   Grace Dumont CMA - 4/24/2019 1:55 PM CDT   Meds / Allergies   (As Of: 4/24/2019 2:00:32 PM CDT)   Allergies (Active)   Neosporin  Estimated Onset Date:   <not entered> 2012 ; Reactions:   skin rash ; Created By:   Thony Salazar MD; Reaction Status:   Active ; Category:   Drug ; Substance:   Neosporin ; Type:   Allergy ; Severity:   Moderate ; Updated By:   Thony Salazar MD; Reviewed Date:   4/24/2019 1:56 PM CDT        Medication List   (As Of: 4/24/2019 2:00:32 PM CDT)   Prescription/Discharge Order    allopurinol  :   allopurinol ; Status:   Prescribed ; Ordered As Mnemonic:   allopurinol 100 mg oral tablet ; Simple Display Line:   100 mg, 1 tab(s), PO, daily, 90 tab(s), 1 Refill(s) ; Ordering Provider:   Thony Salazar MD; Catalog Code:   allopurinol ; Order Dt/Tm:   3/28/2019 4:44:42 PM          aspirin  :   aspirin ; Status:   Prescribed ; Ordered As Mnemonic:   Aspir 81 oral delayed release tablet ; Simple Display Line:   81 mg, 1  Spoke with patient via phone, confirmed the following scheduling, then sent Surgery Packet to patient via MyChart and Mail including related scheduling information and prep instructions:    Your surgery is scheduled:    Date: Tuesday May 31, 2022  ________________________________    Time: 8:00 AM*  ________________________________    Please arrive at:  6:00 AM*  ______________________    Surgeons' Names:   - Dr. Davis: (Menan-Rectal)  - Dr. Ding: (Neurosurgery)  - Dr. Rivera: (Plastics)  - Dr. Wells (Urology)      Pre-Op Physical Fax Numbers:         [facility]Admissions  [facility] Fax:  [phone] / Phone:  [phone]        Your surgery is located at:      [facility]      [facility] [address]C)      [place] [address]      [phone]      [email or web address]     *Times are tentative and may change. You can expect a call from the pre-admission nurses to confirm arrival and surgery start times if the times should change.     Required: Yes, you will need a  18 years or older to drive you home from your procedure as well as stay with you for 24 hours after your procedure, if you are discharged the same day as your procedure.     Surgery: Cystoscopy with Bilateral Uretal Stent Insertion, Excision, Mass, Pelvis, Cytoreductive Surgery, Laparoscopic, Possible Hyperthermic Intraperitoneal Chemotherapy (HIPEC), Possible Small Bowel Excision, Possible Exenteration, Pelvis, Posterior, Possible Sacrectomy, Perineal/back reconstruction with rectus versus gluteal versus thigh flaps, SPY    Upcoming Appointments:     May 10, 2022  9:30 AM  (Arrive by 9:15 AM)  PAC EVALUATION with Camille Romo PA-C  [facility] [place] ([facility] )   [phone]    [address] [facility] 5th Floor [facility] [address]     May 10, 2022 11:20 AM  (Arrive by  "11:05 AM)  New Patient Visit with Walker Ding MD  [facility] [place] ([facility] [place] ) [phone]    [address] [address]     May 10, 2022 12:30 PM  LAB with [facility] LAB  [facility] )   [phone]    [address] [address]     May 27, 2022 11:00 AM  Pre-procedure Covid PCR Test with [facility] COVID LAB  [facility] ([facility])   [phone] [address] [place] [address]     Jorge Luis 15, 2022 10:30 AM  (Arrive by 10:15 AM)  Post-Op with JAIRON Rivera MD  [facility] \[facility]\"" ([facility] ) [phone]    [address] [address]         Jorge Luis 15, 2022 11:30 AM  (Arrive by 11:15 AM)  Post-Op with REBA Shaw CNP  [facility] )   [phone]    [address] [address]     Jul 18, 2022  1:00 PM  (Arrive by 12:45 PM)  Return Visit with Sanjiv Davis MD  [facility] [place] ) [phone]    [address] [address]        Pre-surgical Preparation:      MiraLAX/Gatorade, Magnesium Citrate, Antibiotics, Zofran  - see \"Day Before Surgery\"  - obtain medications and ingredients in advance  - if you have diabetes or blood sugar concerns, please use Gatorade ZERO or Low Sugar, as per recommendation by your physician    Carmen [place]  Mary-op Coordinator  [facility]  Direct Phone: [phone]  " tab(s), PO, Daily, 100 tab(s), 0 Refill(s) ; Ordering Provider:   Thony Salazar MD; Catalog Code:   aspirin ; Order Dt/Tm:   3/26/2018 1:30:54 PM          atenolol-chlorthalidone  :   atenolol-chlorthalidone ; Status:   Prescribed ; Ordered As Mnemonic:   atenolol-chlorthalidone 50 mg-25 mg oral tablet ; Simple Display Line:   1 tab(s), po, daily, 90 tab(s), 1 Refill(s) ; Ordering Provider:   Thony Salazar MD; Catalog Code:   atenolol-chlorthalidone ; Order Dt/Tm:   10/1/2018 2:06:43 PM          betamethasone topical  :   betamethasone topical ; Status:   Prescribed ; Ordered As Mnemonic:   betamethasone valerate 0.1% topical cream ; Simple Display Line:   1 adriel, TOP, BID, 45 g, 1 Refill(s) ; Ordering Provider:   Thony Salazar MD; Catalog Code:   betamethasone topical ; Order Dt/Tm:   3/26/2018 1:30:02 PM          furosemide  :   furosemide ; Status:   Prescribed ; Ordered As Mnemonic:   furosemide 20 mg oral tablet ; Simple Display Line:   40 mg, 2 tab(s), po, daily, 180 tab(s), 0 Refill(s) ; Ordering Provider:   Thony Salazar MD; Catalog Code:   furosemide ; Order Dt/Tm:   3/4/2019 2:17:30 PM          insulin detemir  :   insulin detemir ; Status:   Prescribed ; Ordered As Mnemonic:   Levemir FlexTouch 100 units/mL subcutaneous solution ; Simple Display Line:   See Instructions, 36 units sc QHS, 5 syringes, 3 Refill(s) ; Ordering Provider:   Thony Salazar MD; Catalog Code:   insulin detemir ; Order Dt/Tm:   3/28/2019 4:46:19 PM          insulin lispro  :   insulin lispro ; Status:   Prescribed ; Ordered As Mnemonic:   HumaLOG KwikPen 100 units/mL injectable solution ; Simple Display Line:   4 unit(s), subcutaneous, tidac, 5 syringes, 3 Refill(s) ; Ordering Provider:   Thony Salazar MD; Catalog Code:   insulin lispro ; Order Dt/Tm:   10/1/2018 2:06:14 PM          lisinopril  :   lisinopril ; Status:   Prescribed ; Ordered As Mnemonic:   lisinopril 10 mg oral tablet ; Simple Display Line:   10 mg, 1 tab(s),  PO, Daily, 90 tab(s), 1 Refill(s) ; Ordering Provider:   Thony Salazar MD; Catalog Code:   lisinopril ; Order Dt/Tm:   10/1/2018 2:06:03 PM          loratadine-pseudoephedrine  :   loratadine-pseudoephedrine ; Status:   Prescribed ; Ordered As Mnemonic:   Loratadine-D 12 Hour oral tablet, extended release ; Simple Display Line:   1 tab(s), po, q12 hrs, 180 tab(s), 1 Refill(s) ; Ordering Provider:   Thony Salazar MD; Catalog Code:   loratadine-pseudoephedrine ; Order Dt/Tm:   3/26/2018 1:30:44 PM          Miscellaneous Prescription  :   Miscellaneous Prescription ; Status:   Prescribed ; Ordered As Mnemonic:   Relion Pen Needles [id number] MIS ; Simple Display Line:   See Instructions, for DM E11.9 use with insulin injection BID, 120 EA, 1 Refill(s) ; Ordering Provider:   Thony Salazar MD; Catalog Code:   Miscellaneous Prescription ; Order Dt/Tm:   3/28/2019 4:49:32 PM          Miscellaneous Rx Supply  :   Miscellaneous Rx Supply ; Status:   Prescribed ; Ordered As Mnemonic:   Ronak Contour next test strips ; Simple Display Line:   See Instructions, Testing BID, 200 EA, 1 Refill(s) ; Ordering Provider:   Thony Salazar MD; Catalog Code:   Miscellaneous Rx Supply ; Order Dt/Tm:   9/12/2016 11:07:30 AM          Miscellaneous Rx Supply  :   Miscellaneous Rx Supply ; Status:   Prescribed ; Ordered As Mnemonic:   CONTOUR NEXT TEST Strips ; Simple Display Line:   See Instructions, USE ONE STRIP TO CHECK GLUCOSE TWICE DAILY  DX: E11.9, 180 EA, 1 Refill(s) ; Ordering Provider:   Thony Salazar MD; Catalog Code:   Miscellaneous Rx Supply ; Order Dt/Tm:   12/17/2018 2:26:03 PM          oxybutynin  :   oxybutynin ; Status:   Prescribed ; Ordered As Mnemonic:   oxybutynin 5 mg oral tablet ; Simple Display Line:   5 mg, 1 tab(s), po, bid, 180 tab(s), 1 Refill(s) ; Ordering Provider:   Thony Salazar MD; Catalog Code:   oxybutynin ; Order Dt/Tm:   10/1/2018 2:05:51 PM          simvastatin  :   simvastatin ; Status:   Prescribed ;  Ordered As Mnemonic:   simvastatin 40 mg oral tablet ; Simple Display Line:   40 mg, 1 tab(s), po, hs, 90 tab(s), 0 Refill(s) ; Ordering Provider:   Thony Salazar MD; Catalog Code:   simvastatin ; Order Dt/Tm:   2/27/2019 3:12:05 PM